# Patient Record
Sex: FEMALE | Race: OTHER | NOT HISPANIC OR LATINO | Employment: FULL TIME | URBAN - METROPOLITAN AREA
[De-identification: names, ages, dates, MRNs, and addresses within clinical notes are randomized per-mention and may not be internally consistent; named-entity substitution may affect disease eponyms.]

---

## 2019-11-11 ENCOUNTER — LAB (OUTPATIENT)
Dept: LAB | Age: 29
End: 2019-11-11

## 2019-11-11 ENCOUNTER — TRANSCRIBE ORDERS (OUTPATIENT)
Dept: URGENT CARE | Age: 29
End: 2019-11-11

## 2019-11-11 ENCOUNTER — APPOINTMENT (OUTPATIENT)
Dept: URGENT CARE | Age: 29
End: 2019-11-11

## 2019-11-11 DIAGNOSIS — Z13.9 SCREENING FOR UNSPECIFIED CONDITION: Primary | ICD-10-CM

## 2019-11-11 DIAGNOSIS — Z13.9 SCREENING FOR UNSPECIFIED CONDITION: ICD-10-CM

## 2019-11-11 LAB
CHOLEST SERPL-MCNC: 167 MG/DL (ref 50–200)
GLUCOSE P FAST SERPL-MCNC: 88 MG/DL (ref 65–99)
HDLC SERPL-MCNC: 49 MG/DL
LDLC SERPL CALC-MCNC: 106 MG/DL (ref 0–100)
NONHDLC SERPL-MCNC: 118 MG/DL
TRIGL SERPL-MCNC: 60 MG/DL

## 2019-11-11 PROCEDURE — 80323 ALKALOIDS NOS: CPT

## 2019-11-11 PROCEDURE — 80061 LIPID PANEL: CPT

## 2019-11-11 PROCEDURE — 82947 ASSAY GLUCOSE BLOOD QUANT: CPT

## 2019-11-11 PROCEDURE — 36415 COLL VENOUS BLD VENIPUNCTURE: CPT

## 2019-11-14 LAB
COTININE SERPL-MCNC: NORMAL NG/ML
NICOTINE SERPL-MCNC: NORMAL NG/ML

## 2019-12-02 ENCOUNTER — TELEPHONE (OUTPATIENT)
Dept: URGENT CARE | Facility: CLINIC | Age: 29
End: 2019-12-02

## 2019-12-02 NOTE — TELEPHONE ENCOUNTER
Lab results discussed with patient    Recommend healthy diet and exercise and discuss her results with her PCP at her next visit       ----- Message from Pipo Jama sent at 11/22/2019 10:15 AM EST -----      ----- Message -----  From: LACY Hardy  Sent: 11/13/2019   1:14 PM EST  To: Sam Singh MA        ----- Message -----  From: Lab, Background User  Sent: 11/11/2019   4:36 PM EST  To: LACY Hardy

## 2020-10-29 ENCOUNTER — APPOINTMENT (OUTPATIENT)
Dept: URGENT CARE | Facility: CLINIC | Age: 30
End: 2020-10-29

## 2020-10-29 DIAGNOSIS — Z23 NEED FOR IMMUNIZATION AGAINST INFLUENZA: Primary | ICD-10-CM

## 2020-11-18 ENCOUNTER — HOSPITAL ENCOUNTER (EMERGENCY)
Facility: HOSPITAL | Age: 30
Discharge: HOME | End: 2020-11-18
Attending: EMERGENCY MEDICINE
Payer: COMMERCIAL

## 2020-11-18 VITALS
HEART RATE: 108 BPM | DIASTOLIC BLOOD PRESSURE: 74 MMHG | TEMPERATURE: 96.8 F | SYSTOLIC BLOOD PRESSURE: 116 MMHG | OXYGEN SATURATION: 100 % | WEIGHT: 99.21 LBS | RESPIRATION RATE: 18 BRPM

## 2020-11-18 DIAGNOSIS — R53.83 FATIGUE: Primary | ICD-10-CM

## 2020-11-18 DIAGNOSIS — Z20.822 SUSPECTED COVID-19 VIRUS INFECTION: ICD-10-CM

## 2020-11-18 LAB
ATRIAL RATE: 106 BPM
BILIRUB UR QL STRIP: NEGATIVE
CLARITY UR: CLEAR
COLOR UR: NORMAL
EXT PREG TEST URINE: NORMAL
EXT. CONTROL ED NAV: NORMAL
GLUCOSE SERPL-MCNC: 94 MG/DL (ref 65–140)
GLUCOSE UR STRIP-MCNC: NEGATIVE MG/DL
HGB UR QL STRIP.AUTO: NEGATIVE
KETONES UR STRIP-MCNC: NEGATIVE MG/DL
LEUKOCYTE ESTERASE UR QL STRIP: NEGATIVE
NITRITE UR QL STRIP: NEGATIVE
P AXIS: 67 DEGREES
PH UR STRIP.AUTO: 7 [PH]
PR INTERVAL: 128 MS
PROT UR STRIP-MCNC: NEGATIVE MG/DL
QRS AXIS: 52 DEGREES
QRSD INTERVAL: 64 MS
QT INTERVAL: 322 MS
QTC INTERVAL: 427 MS
SP GR UR STRIP.AUTO: 1.01 (ref 1–1.04)
T WAVE AXIS: 47 DEGREES
UROBILINOGEN UA: NEGATIVE MG/DL
VENTRICULAR RATE: 106 BPM

## 2020-11-18 PROCEDURE — 81025 URINE PREGNANCY TEST: CPT | Performed by: EMERGENCY MEDICINE

## 2020-11-18 PROCEDURE — 93010 ELECTROCARDIOGRAM REPORT: CPT | Performed by: INTERNAL MEDICINE

## 2020-11-18 PROCEDURE — 93005 ELECTROCARDIOGRAM TRACING: CPT

## 2020-11-18 PROCEDURE — 99284 EMERGENCY DEPT VISIT MOD MDM: CPT | Performed by: EMERGENCY MEDICINE

## 2020-11-18 PROCEDURE — 82948 REAGENT STRIP/BLOOD GLUCOSE: CPT

## 2020-11-18 PROCEDURE — 81003 URINALYSIS AUTO W/O SCOPE: CPT | Performed by: EMERGENCY MEDICINE

## 2020-11-18 PROCEDURE — 87637 SARSCOV2&INF A&B&RSV AMP PRB: CPT | Performed by: EMERGENCY MEDICINE

## 2020-11-18 PROCEDURE — 99283 EMERGENCY DEPT VISIT LOW MDM: CPT

## 2020-11-21 LAB
FLUAV RNA NPH QL NAA+PROBE: NOT DETECTED
FLUBV RNA NPH QL NAA+PROBE: NOT DETECTED
RSV RNA NPH QL NAA+PROBE: NOT DETECTED
SARS-COV-2 RNA NPH QL NAA+PROBE: NOT DETECTED

## 2020-12-15 ENCOUNTER — OFFICE VISIT (OUTPATIENT)
Dept: PSYCHOLOGY | Facility: CLINIC | Age: 30
End: 2020-12-15
Payer: COMMERCIAL

## 2020-12-15 ENCOUNTER — OFFICE VISIT (OUTPATIENT)
Dept: PSYCHIATRY | Facility: CLINIC | Age: 30
End: 2020-12-15
Payer: COMMERCIAL

## 2020-12-15 VITALS
BODY MASS INDEX: 17.94 KG/M2 | WEIGHT: 95 LBS | HEART RATE: 92 BPM | TEMPERATURE: 97.9 F | DIASTOLIC BLOOD PRESSURE: 73 MMHG | HEIGHT: 61 IN | RESPIRATION RATE: 18 BRPM | SYSTOLIC BLOOD PRESSURE: 111 MMHG

## 2020-12-15 DIAGNOSIS — F41.1 GENERALIZED ANXIETY DISORDER: Primary | ICD-10-CM

## 2020-12-15 PROCEDURE — S0201 PARTIAL HOSPITALIZATION SERV: HCPCS

## 2020-12-15 PROCEDURE — 90791 PSYCH DIAGNOSTIC EVALUATION: CPT

## 2020-12-15 PROCEDURE — 90792 PSYCH DIAG EVAL W/MED SRVCS: CPT | Performed by: PSYCHIATRY & NEUROLOGY

## 2020-12-15 RX ORDER — HYDROXYZINE HYDROCHLORIDE 25 MG/1
25 TABLET, FILM COATED ORAL 3 TIMES DAILY PRN
COMMUNITY
Start: 2020-11-17

## 2020-12-16 ENCOUNTER — OFFICE VISIT (OUTPATIENT)
Dept: PSYCHOLOGY | Facility: CLINIC | Age: 30
End: 2020-12-16
Payer: COMMERCIAL

## 2020-12-16 DIAGNOSIS — F41.1 GENERALIZED ANXIETY DISORDER: Primary | ICD-10-CM

## 2020-12-16 PROCEDURE — G0176 OPPS/PHP;ACTIVITY THERAPY: HCPCS

## 2020-12-16 PROCEDURE — S0201 PARTIAL HOSPITALIZATION SERV: HCPCS

## 2020-12-16 PROCEDURE — G0410 GRP PSYCH PARTIAL HOSP 45-50: HCPCS

## 2020-12-16 PROCEDURE — G0177 OPPS/PHP; TRAIN & EDUC SERV: HCPCS

## 2020-12-17 ENCOUNTER — OFFICE VISIT (OUTPATIENT)
Dept: PSYCHOLOGY | Facility: CLINIC | Age: 30
End: 2020-12-17
Payer: COMMERCIAL

## 2020-12-17 DIAGNOSIS — F41.1 GENERALIZED ANXIETY DISORDER: Primary | ICD-10-CM

## 2020-12-17 PROCEDURE — G0410 GRP PSYCH PARTIAL HOSP 45-50: HCPCS

## 2020-12-17 PROCEDURE — S0201 PARTIAL HOSPITALIZATION SERV: HCPCS

## 2020-12-17 PROCEDURE — G0177 OPPS/PHP; TRAIN & EDUC SERV: HCPCS

## 2020-12-17 PROCEDURE — G0176 OPPS/PHP;ACTIVITY THERAPY: HCPCS

## 2020-12-18 ENCOUNTER — OFFICE VISIT (OUTPATIENT)
Dept: PSYCHOLOGY | Facility: CLINIC | Age: 30
End: 2020-12-18
Payer: COMMERCIAL

## 2020-12-18 DIAGNOSIS — F41.1 GENERALIZED ANXIETY DISORDER: Primary | ICD-10-CM

## 2020-12-18 PROCEDURE — G0177 OPPS/PHP; TRAIN & EDUC SERV: HCPCS

## 2020-12-18 PROCEDURE — G0410 GRP PSYCH PARTIAL HOSP 45-50: HCPCS

## 2020-12-18 PROCEDURE — S0201 PARTIAL HOSPITALIZATION SERV: HCPCS

## 2020-12-18 PROCEDURE — G0176 OPPS/PHP;ACTIVITY THERAPY: HCPCS

## 2020-12-21 ENCOUNTER — OFFICE VISIT (OUTPATIENT)
Dept: PSYCHOLOGY | Facility: CLINIC | Age: 30
End: 2020-12-21
Payer: COMMERCIAL

## 2020-12-21 ENCOUNTER — TELEMEDICINE (OUTPATIENT)
Dept: PSYCHIATRY | Facility: CLINIC | Age: 30
End: 2020-12-21
Payer: COMMERCIAL

## 2020-12-21 DIAGNOSIS — F41.1 GENERALIZED ANXIETY DISORDER: Primary | ICD-10-CM

## 2020-12-21 PROCEDURE — G0410 GRP PSYCH PARTIAL HOSP 45-50: HCPCS

## 2020-12-21 PROCEDURE — S0201 PARTIAL HOSPITALIZATION SERV: HCPCS

## 2020-12-21 PROCEDURE — G0177 OPPS/PHP; TRAIN & EDUC SERV: HCPCS

## 2020-12-21 PROCEDURE — 99213 OFFICE O/P EST LOW 20 MIN: CPT | Performed by: NURSE PRACTITIONER

## 2020-12-22 ENCOUNTER — APPOINTMENT (OUTPATIENT)
Dept: PSYCHOLOGY | Facility: CLINIC | Age: 30
End: 2020-12-22
Payer: COMMERCIAL

## 2020-12-22 ENCOUNTER — DOCUMENTATION (OUTPATIENT)
Dept: PSYCHOLOGY | Facility: CLINIC | Age: 30
End: 2020-12-22

## 2020-12-23 ENCOUNTER — OFFICE VISIT (OUTPATIENT)
Dept: PSYCHOLOGY | Facility: CLINIC | Age: 30
End: 2020-12-23
Payer: COMMERCIAL

## 2020-12-23 DIAGNOSIS — F41.1 GENERALIZED ANXIETY DISORDER: Primary | ICD-10-CM

## 2020-12-23 PROCEDURE — S0201 PARTIAL HOSPITALIZATION SERV: HCPCS

## 2020-12-23 PROCEDURE — G0176 OPPS/PHP;ACTIVITY THERAPY: HCPCS

## 2020-12-23 PROCEDURE — G0177 OPPS/PHP; TRAIN & EDUC SERV: HCPCS

## 2020-12-23 PROCEDURE — G0410 GRP PSYCH PARTIAL HOSP 45-50: HCPCS

## 2020-12-24 ENCOUNTER — OFFICE VISIT (OUTPATIENT)
Dept: PSYCHOLOGY | Facility: CLINIC | Age: 30
End: 2020-12-24
Payer: COMMERCIAL

## 2020-12-24 DIAGNOSIS — F41.1 GENERALIZED ANXIETY DISORDER: Primary | ICD-10-CM

## 2020-12-24 PROCEDURE — S0201 PARTIAL HOSPITALIZATION SERV: HCPCS

## 2020-12-24 PROCEDURE — G0410 GRP PSYCH PARTIAL HOSP 45-50: HCPCS

## 2020-12-24 PROCEDURE — G0176 OPPS/PHP;ACTIVITY THERAPY: HCPCS

## 2020-12-28 ENCOUNTER — OFFICE VISIT (OUTPATIENT)
Dept: PSYCHOLOGY | Facility: CLINIC | Age: 30
End: 2020-12-28
Payer: COMMERCIAL

## 2020-12-28 ENCOUNTER — TELEMEDICINE (OUTPATIENT)
Dept: PSYCHIATRY | Facility: CLINIC | Age: 30
End: 2020-12-28
Payer: COMMERCIAL

## 2020-12-28 DIAGNOSIS — F41.1 GENERALIZED ANXIETY DISORDER: Primary | ICD-10-CM

## 2020-12-28 PROCEDURE — 99214 OFFICE O/P EST MOD 30 MIN: CPT | Performed by: NURSE PRACTITIONER

## 2020-12-28 PROCEDURE — G0410 GRP PSYCH PARTIAL HOSP 45-50: HCPCS

## 2020-12-28 PROCEDURE — S0201 PARTIAL HOSPITALIZATION SERV: HCPCS

## 2020-12-28 PROCEDURE — G0176 OPPS/PHP;ACTIVITY THERAPY: HCPCS

## 2020-12-28 PROCEDURE — G0177 OPPS/PHP; TRAIN & EDUC SERV: HCPCS

## 2020-12-28 RX ORDER — SERTRALINE HYDROCHLORIDE 25 MG/1
25 TABLET, FILM COATED ORAL DAILY
Qty: 30 TABLET | Refills: 0 | Status: SHIPPED | OUTPATIENT
Start: 2020-12-28 | End: 2021-05-18 | Stop reason: ALTCHOICE

## 2020-12-29 ENCOUNTER — DOCUMENTATION (OUTPATIENT)
Dept: PSYCHOLOGY | Facility: CLINIC | Age: 30
End: 2020-12-29

## 2020-12-29 ENCOUNTER — APPOINTMENT (OUTPATIENT)
Dept: PSYCHOLOGY | Facility: CLINIC | Age: 30
End: 2020-12-29
Payer: COMMERCIAL

## 2020-12-30 ENCOUNTER — OFFICE VISIT (OUTPATIENT)
Dept: PSYCHOLOGY | Facility: CLINIC | Age: 30
End: 2020-12-30
Payer: COMMERCIAL

## 2020-12-30 DIAGNOSIS — F41.1 GENERALIZED ANXIETY DISORDER: Primary | ICD-10-CM

## 2020-12-30 PROCEDURE — S0201 PARTIAL HOSPITALIZATION SERV: HCPCS

## 2020-12-30 PROCEDURE — G0177 OPPS/PHP; TRAIN & EDUC SERV: HCPCS

## 2020-12-30 PROCEDURE — G0410 GRP PSYCH PARTIAL HOSP 45-50: HCPCS

## 2020-12-31 ENCOUNTER — OFFICE VISIT (OUTPATIENT)
Dept: PSYCHOLOGY | Facility: CLINIC | Age: 30
End: 2020-12-31
Payer: COMMERCIAL

## 2020-12-31 DIAGNOSIS — F41.1 GENERALIZED ANXIETY DISORDER: Primary | ICD-10-CM

## 2020-12-31 PROCEDURE — S0201 PARTIAL HOSPITALIZATION SERV: HCPCS

## 2020-12-31 PROCEDURE — G0410 GRP PSYCH PARTIAL HOSP 45-50: HCPCS

## 2020-12-31 PROCEDURE — G0177 OPPS/PHP; TRAIN & EDUC SERV: HCPCS

## 2021-01-04 ENCOUNTER — DOCUMENTATION (OUTPATIENT)
Dept: PSYCHOLOGY | Facility: CLINIC | Age: 31
End: 2021-01-04

## 2021-01-04 ENCOUNTER — APPOINTMENT (OUTPATIENT)
Dept: PSYCHOLOGY | Facility: CLINIC | Age: 31
End: 2021-01-04
Payer: COMMERCIAL

## 2021-01-04 ENCOUNTER — TELEMEDICINE (OUTPATIENT)
Dept: PSYCHIATRY | Facility: CLINIC | Age: 31
End: 2021-01-04
Payer: COMMERCIAL

## 2021-01-04 DIAGNOSIS — F41.1 GENERALIZED ANXIETY DISORDER: Primary | ICD-10-CM

## 2021-01-04 PROCEDURE — 99213 OFFICE O/P EST LOW 20 MIN: CPT | Performed by: NURSE PRACTITIONER

## 2021-01-04 NOTE — PROGRESS NOTES
This note was not shared with the patient due to this is a psychotherapy note   Subjective:     Patient ID: Deandre Arenas is a 31 y o  female  [facility] Clinical Progress Notes      Specialized Services Documentation  Therapist must complete separate progress note for each specific clinical activity in which the individual participated during the day  Case Management Note    Quinton Montalvo MT-BC    Current suicide risk : Low     1422 Excused in preparation for discharge and transition appointment  Spoke with Deandre Arenas [phone] to assure readiness for discharge and confirm aftercare  She reported she is doing well and ready for discharge tomorrow  Relapse prevention plan forwarded to her for completion  Medications changes/added/denied? No    Treatment session number: na    Individual Case Management Visit provided today? Yes     [facility] follow up physician's orders: see S   Yandy Note

## 2021-01-04 NOTE — PSYCH
Virtual Regular Visit    Problem List Items Addressed This Visit        Other    Generalized anxiety disorder - Primary             Encounter provider LACY Thrasher    Provider located at   [address]  [facility] [address]    Recent Visits  Date Type Provider Dept   12/28/20 Telemedicine Mandie Thrasher 426 recent visits within past 7 days and meeting all other requirements     Today's Visits  Date Type Provider Dept   01/04/21 Telemedicine Mandie Thrasher 426 today's visits and meeting all other requirements     Future Appointments  No visits were found meeting these conditions  Showing future appointments within next 150 days and meeting all other requirements      The patient was identified by name and date of birth  Hitesh Barboza was informed that this is a telemedicine visit and that the visit is being conducted through [employer]  My office door was closed  No one else was in the room  She acknowledged consent and understanding of privacy and security of the video platform  The patient has agreed to participate and understands they can discontinue the visit at any time  Patient is aware this is a billable service  HPI     Current Outpatient Medications   Medication Sig Dispense Refill    hydrOXYzine HCL (ATARAX) 25 mg tablet Take 25 mg by mouth 3 (three) times a day as needed for anxiety      sertraline (ZOLOFT) 25 mg tablet Take 1 tablet (25 mg total) by mouth daily 30 tablet 0     No current facility-administered medications for this visit  Review of Systems  Video Exam    There were no vitals filed for this visit  Physical Exam   As a result of this visit, I have referred the patient for further respiratory evaluation   No    I spent 15 minutes directly with the patient during this visit  VIRTUAL VISIT DISCLAIMER    Greg Zepeda acknowledges that she has consented to an online visit or consultation  She understands that the online visit is based solely on information provided by her, and that, in the absence of a face-to-face physical evaluation by the physician, the diagnosis she receives is both limited and provisional in terms of accuracy and completeness  This is not intended to replace a full medical face-to-face evaluation by the physician  Greg Zepeda understands and accepts these terms  PHP MEDICATION MANAGEMENT NOTE        [facility]    Name and Date of Birth:  Greg Zepeda 31 y o  1990 MRN: [id number]    Date of Visit: January 4, 2021    No Known Allergies  SUBJECTIVE:    Didi Elizabeth is seen today for a follow up for anxiety  She reports that she has improved slightly since the last visit  Patient is taking half tablet of Zoloft 25 mg daily  States she initially had some increased anxiety upset stomach  However now patient is doing better, able to eat 3 small meals per day and is sleeping through the night  Adequate supply of medication  Planned discharge tomorrow  She denies any side effects from medications  PLAN:  Follow-up with outpatient psychiatry and individual therapy  Aware of 24 hour and weekend coverage for urgent situations accessed by calling [facility] main practice number  Continue partial hospitalization program    Diagnoses and all orders for this visit:    Generalized anxiety disorder        Current Outpatient Medications on File Prior to Visit   Medication Sig Dispense Refill    hydrOXYzine HCL (ATARAX) 25 mg tablet Take 25 mg by mouth 3 (three) times a day as needed for anxiety      sertraline (ZOLOFT) 25 mg tablet Take 1 tablet (25 mg total) by mouth daily 30 tablet 0     No current facility-administered medications on file prior to visit          Psychotherapy Provided:     Individual psychotherapy provided: No    HPI ROS Appetite Changes and Sleep:     She reports adequate number of sleep hours (7 hours), adequate appetite, adequate energy level   Patient denies suicidal or homicidal ideation    Review Of Systems:      General emotional problems, appetite disturbances and decreased functioning   Personality no change in personality   Constitutional negative   ENT negative   Cardiovascular negative   Respiratory negative   Gastrointestinal negative   Genitourinary negative   Musculoskeletal negative   Integumentary negative   Neurological negative   Endocrine negative   Other Symptoms none, all other systems are negative     Mental Status Evaluation:    Appearance Adequate hygiene and grooming   Behavior calm and cooperative   Mood anxious  Depression Scale -  of 10 (0 = No depression)  Anxiety Scale -  of 10 (0 = No anxiety)   Speech Normal rate and volume   Affect appropriate and mood-congruent   Thought Processes Goal directed and coherent   Thought Content Does not verbalize delusional material   Associations Tightly connected   Perceptual Disturbances Denies hallucinations and does not appear to be responding to internal stimuli   Risk Potential Suicidal/Homicidal Ideation - No evidence of suicidal or homicidal ideation and Patient does not verbalize suicidal or homicidal ideation  Risk of Violence - No evidence of risk for violence found on assessment  Risk of Self Mutilation - No evidence of risk for self mutilation found on assessment   Orientation oriented to person, place, time/date and situation   Memory recent and remote memory grossly intact   Consciousness alert and awake   Attention/Concentration attention span and concentration are age appropriate   Insight fair   Judgement fair   Muscle Strength and Gait normal muscle strength and normal muscle tone, normal gait/station and normal balance   Motor Activity no abnormal movements   Language no difficulty naming common objects, no difficulty repeating a phrase, no difficulty writing a sentence   Fund of Knowledge adequate knowledge of current events  adequate fund of knowledge regarding past history  adequate fund of knowledge regarding vocabulary      Past Psychiatric History Update:     Inpatient Psychiatric Admission Since Last Encounter:   no  Suicide Attempt Or Self Mutilation Since Last Encounter:   no  Incidence of Violent Behavior Since Last Encounter:   no    Traumatic History Update:     New Onset of Abuse Since Last Encounter:   no  Traumatic Events Since Last Encounter:   no    Past Medical History:    Past Medical History:   Diagnosis Date    Anxiety      Past Medical History Pertinent Negatives:   Diagnosis Date Noted    Head injury 12/15/2020    Seizures ([address] ) 12/15/2020     Past Surgical History:   Procedure Laterality Date    CYST REMOVAL Left      No Known Allergies  Substance Abuse History:    Social History     Substance and Sexual Activity   Alcohol Use Not Currently    Frequency: Never    Comment: She only drinks in social events     Social History     Substance and Sexual Activity   Drug Use Never     Social History:    Social History     Socioeconomic History    Marital status: Single     Spouse name: Not on file    Number of children: 0    Years of education: associate degree     Highest education level: Associate degree: academic program   Occupational History     Employer: [address] [place] [address] resource strain: Not hard at all   [employer] [place] insecurity     Worry: Not on file     Inability: Not on file   [employer] needs     Medical: Not on file     Non-medical: Not on file   Tobacco Use    Smoking status: Never Smoker    Smokeless tobacco: Never Used   Substance and Sexual Activity    Alcohol use: Not Currently     Frequency: Never     Comment: She only drinks in social events    Drug use: Never    Sexual activity: Not on file   Lifestyle    Physical activity     Days per week: 3 days     Minutes per session: 10 min    Stress: Only a little   Relationships    Social connections     Talks on phone: More than three times a week     Gets together: Not on file     Attends Oriental orthodox service: Not on file     Active member of club or organization: Not on file     Attends meetings of clubs or organizations: Not on file     Relationship status: Never     Intimate partner violence     Fear of current or ex partner: No     Emotionally abused: No     Physically abused: No     Forced sexual activity: No   Other Topics Concern    Not on file   Social History Narrative    Not on file     Family Psychiatric History:     Family History   Problem Relation Age of Onset    Seizures Mother     Hyperlipidemia Father     No Known Problems Sister     Psychiatric Illness Neg Hx      History Review: The following portions of the patient's history were reviewed and updated as appropriate: allergies, current medications, past family history, past medical history, past social history, past surgical history and problem list     OBJECTIVE:     Vital signs in last 24 hours: There were no vitals filed for this visit  Laboratory Results: I have personally reviewed all pertinent laboratory/tests results  Medications Risks/Benefits:      Risks, Benefits And Possible Side Effects Of Medications:    Discussed risks and benefits of treatment with patient including risk of suicidality, serotonin syndrome and SIADH related to treatment with antidepressants; Risk of induction of manic symptoms in certain patient populations     Controlled Medication Discussion:     Not applicable    LACY Burns 01/04/21    This note was shared with patient

## 2021-01-05 ENCOUNTER — OFFICE VISIT (OUTPATIENT)
Dept: PSYCHOLOGY | Facility: CLINIC | Age: 31
End: 2021-01-05
Payer: COMMERCIAL

## 2021-01-05 DIAGNOSIS — F41.1 GENERALIZED ANXIETY DISORDER: Primary | ICD-10-CM

## 2021-01-05 PROCEDURE — G0176 OPPS/PHP;ACTIVITY THERAPY: HCPCS

## 2021-01-05 PROCEDURE — S0201 PARTIAL HOSPITALIZATION SERV: HCPCS

## 2021-01-05 PROCEDURE — G0410 GRP PSYCH PARTIAL HOSP 45-50: HCPCS

## 2021-01-05 PROCEDURE — G0177 OPPS/PHP; TRAIN & EDUC SERV: HCPCS

## 2021-01-05 NOTE — PSYCH
This note was not shared with the patient due to this is a psychotherapy note     Subjective:     Patient ID: Dilia Loredo is a 31 y o  female  [facility] Discharge Summary:   Admission Date: 12/15/2020  Patient was referred by Dr Stephie Keita , PCP  Discharge Date: 01/05/21   Was this a routine discharge? yes   Diagnosis: Axis I:   1  Generalized anxiety disorder        Treating Physician: Dr Lata Lyles MD   Treatment Complications: none  Presenting Need:   Per Dr David Wan: Seven Brown is a 31 y  o  female with generalized anxiety disorder and vitamin-D deficiency referred by Dr Stephie Keita  , her primary physician because she has increased anxiety, excessive worries, changes in sleep and appetite   Onset of symptoms was  a few weeks ago with gradually worsening course since that time   Psychosocial Stressors:  Relationship   She states that she has suffered from anxiety for some time but this became worse to 3 weeks ago when her anxiety was very high, was not eating, was vomiting after she tried to eat something, she was not sleeping, poor concentration low motivation and low energy   She states that she has to be sees to the ED with multiples physical symptoms but all her labs and test were normal   She states that she have been in a relationship since she was 18, unfortunate he had been incarcerated for 7 years and now he is very close to be released, but she worries that he had not changed and he still have issue with some drugs   She states that she never has told him how she feels because she does not want to hurt his feelings but now she realized that she wanted to know where they are in the relationship and if he is willing not to use drugs and if he is willing to work in the relationship for the best   This has been difficult for her and have increased her anxiety level   She also had been supportive of him all of this years   She get along very well with his family, she also has support from her family   She states that she have a good job   She denies any other issues Rossy Brown feels anxious, denies any suicidal homicidal ideation plan or intent, denies any psychotic symptoms, denies any history manic episodes   She states her primary physician gave to her Atarax that was helpful and she only uses p r n  Ravi Greenwood was also prescribed Zoloft 25 mg p o  Daily but she only took it once and her primary physician decided to hold in this medication at this moment and recommended that she come to the program for group therapy and after that follow individual therapy       Per this writer: Delma Nye referred by her PCP due to increased anxiety with changes in ability to complete daily tasks  On FMLA, difficulty eating, sleeping, and having energy to complete daily tasks  Diminished interests and isolation  She recognizing she is anxious about her future after holding on to a 8year old relationship with a gentleman incarcerated for drug addiction  He will be released from halfway within 2 years and she is fearful of the continued cycle  Despite this, she continues to voice uncertainty about ending the relationship  She feels "pressured" and ruminates when alone       Per Edwena Schaumann: "I feel so emotional and different it is scary"  Strengths: family support, enjoys her work, motivated for treatment     Course of treatment includes:    group counseling, medication management, individual case management, allied therapy, psychoeducation, psychiatric evaluation and individual therapy      Treatment Progress: Delma Nye attended 10 treatment days in which objectives focused on self-care, education related to co-dependent patterns, and anxiety reduction strategies  Edwena Schaumann was active in 1:1 time and was attentive during groups  She was quiet in sessions, but did increase comfort and shared more throughout time in groups   She is able to identify unhealthy aspects of current relationship, yet still struggles with making decisions related to future  She was able to work on General VANCL and beginning to identify personal self-care needs  She was started on low dose of Zoloft which can be titrated  Nabor Chavez was receptive to ongoing OP therapy  Jose Rtina Castellanos shared improvement through anxiety feeling "stable", sleep and appetite improvement, and beng more social She felt her initial therapy appointment went very well yesterday and is  "excited" to continue  Denied SI, HI, and psychosis  Aftercare providers to receive summary       Aftercare recommendations include:   Medication Management:   Dr Pattie Cintron  [phone]  January 18,2021 3:45pm     [address]   [phone]  January 11, 2021 5pm    Discharge Medications include:  Current Outpatient Medications:     hydrOXYzine HCL (ATARAX) 25 mg tablet, Take 25 mg by mouth 3 (three) times a day as needed for anxiety, Disp: , Rfl:     sertraline (ZOLOFT) 25 mg tablet, Take 1 tablet (25 mg total) by mouth daily, Disp: 30 tablet, Rfl: 0

## 2021-01-05 NOTE — PSYCH
Virtual Regular Visit      Assessment/Plan:    Problem List Items Addressed This Visit        Other    Generalized anxiety disorder - Primary               Reason for visit is VIRTUAL PHP DUE TO COVID-19       Encounter provider [facility] [address] [place] [address] PROGRAM    Provider located at [address] [place] [id number]      Recent Visits  Date Type Provider Dept   12/31/20 Office Visit [facility]   12/30/20 Office Visit [facility]   Showing recent visits within past 7 days and meeting all other requirements     Today's Visits  Date Type Provider Dept   01/05/21 Office Visit [facility]   Showing today's visits and meeting all other requirements     Future Appointments  No visits were found meeting these conditions  Showing future appointments within next 150 days and meeting all other requirements        The patient was identified by name and date of birth  Dilia Loredo was informed that this is a telemedicine visit and that the visit is being conducted through [employer] and patient was informed that this is a secure, HIPAA-compliant platform  She agrees to proceed     My office door was closed  No one else was in the room  She acknowledged consent and understanding of privacy and security of the video platform  The patient has agreed to participate and understands they can discontinue the visit at any time  Patient is aware this is a billable service  Subjective  Dilia Loredo is a 31 y o  female          HPI     Past Medical History:   Diagnosis Date    Anxiety        Past Surgical History:   Procedure Laterality Date    CYST REMOVAL Left        Current Outpatient Medications   Medication Sig Dispense Refill    hydrOXYzine HCL (ATARAX) 25 mg tablet Take 25 mg by mouth 3 (three) times a day as needed for anxiety      sertraline (ZOLOFT) 25 mg tablet Take 1 tablet (25 mg total) by mouth daily 30 tablet 0     No current facility-administered medications for this visit  No Known Allergies    Review of Systems    Video Exam    There were no vitals filed for this visit  Physical Exam     I spent 4 hours of group + case management minutes with patient today in which greater than 50% of the time was spent in counseling/coordination of care regarding see notes  VIRTUAL VISIT DISCLAIMER    Horacio Cuenca acknowledges that she has consented to an online visit or consultation  She understands that the online visit is based solely on information provided by her, and that, in the absence of a face-to-face physical evaluation by the physician, the diagnosis she receives is both limited and provisional in terms of accuracy and completeness  This is not intended to replace a full medical face-to-face evaluation by the physician  Horacio Cuenca understands and accepts these terms

## 2021-01-05 NOTE — PSYCH
This note was not shared with the patient due to this is a psychotherapy note       Subjective:     Patient ID: Tasia Padilla is a 31 y o  female  [facility] Clinical Progress Notes      Specialized Services Documentation  Therapist must complete separate progress note for each specific clinical activity in which the individual participated during the day  Allied Therapy   This group was facilitated virtually in a private office using [employer] and Approved [employer] Teams  Tasia Padilla consented to the use of tele-video modality of treatment  [id number] Tasia Padilla  actively shared in [facility] group exploring DBT distress tolerance crisis survival strategies  Group explored crisis survival strategies ACCEPTS, TIP, STOP, and PROS & CONS) reinforcing actions one could take to learn to tolerate stressful experiences, thoughts and urges  Adela Cantrell was attentive  She felt DISTRACTS with ACCEPTS was a skill could put effort into practicing  Some positive progress toward goal noted  Discharge at the end of the treatment day  Tx Plan Objective: 1 1, Therapist:  Tami SMITH    Case Management Note  This case management session was facilitated virtually in a private office using HIPPA Compliant and Approved [employer] Teams  Tasia Padilla consented to the use of tele-video modality of treatment  Tami GONZALEZBC    Current suicide risk : Low     [id number] Met with Tasia Padilla  Reviewed relapse prevention plan, aftercare plan, and medication list (copies provided)  Tasia Padilla shared improvement through anxiety feeling "stable", sleep and appetite improvement, and beng more social She felt her initial therapy appointment went very well yesterday and is  "excited" to continue  Denied SI, HI, and psychosis  Aftercare providers to receive summary  Medications changes/added/denied?  No    Treatment session number: 10    Individual Case Management Visit provided today?  Yes     [facility] follow up physician's orders: see discharge order

## 2021-01-05 NOTE — PSYCH
Assessment/Plan:      There are no diagnoses linked to this encounter  Subjective:     Patient ID: Venkatesh Crook is a 31 y o  female  [facility]   AREAS OF NEED: Anxiety as evidenced by sleep and appetite disturbance, low energy, ruminating thoughts, feeling pressured, isolation, negative thoughts, physical discomfort related to relationship stressors  Date Initiated: 12/15/2020    Strengths: family support, enjoys her work, motivation for treatment     LONG TERM GOAL:   Date Initiated: 12/15/2020  1 0 I will identify 3 signs that my anxiety has lessened in intensity and frequency and I feel more productive in my day to day life  Target Date: 1/12/2021  Completion Date: 01/05/21       SHORT TERM OBJECTIVES:     Date Initiated:  12/15/2020  1 1 I will identify 3 mindfulness and/or distress tolerance skills I can utilize to manage anxiety - practicing at least 2 daily  Revision Date: 12/28/2020  Target Date: 12/28/2020  Completion Date: 01/05/21     Date Initiated:  12/15/2020  1 2 I will read handouts on co-dependency provided and begin to keep a journal of 3 things I am doing for self-care daily  Revision Date: 12/28/2020  Target Date: 12/28/2020  Completion Date: 01/05/21     Date Initiated:  12/15/2020  1 3 I will take medications as prescribed and share questions and concerns if arise  Revision Date: 12/28/2020  Target Date: 12/28/2020  Completion Date: 01/05/21     Date Initiated:  12/15/2020  1 4 I will identify 3 ways my supports can assist in my recovery and agree to staff/support contact as indicated      Revision Date: 12/28/2020  Target Date: 12/28/2020  Completion Date: 01/05/21          7 DAY REVISION:    Date Initiated:12/28/2020  1 5 I will identify 3 strategies that are helping me decrease my anxiety and 3 ways I will support my ongoing wellness after discharge  Revision Date:   Target Date: 01/08/2021  Completion Date:01/05/21       PSYCHIATRY:  Date Initiated: 12/15/2020   Medication Management and Education       Revision Date: 12/28/2020       Continue medication management  The person(s) responsible for carrying out the plan is Vilma Bassett MD    NURSING:   Date Initiated: 12/15/2020   1 1,1 2,1 3,1 4 This RN will provide daily wellness group five days weekly to educate Ashlee Porras on S/S of her diagnoses and medications used in treatment  Revision Date:12/28/2020  1 1,1 2,1 3,1 4,1 5 Continue to encourage Ashlee Porras to participate in wellness groups daily to learn about symptoms, coping strategies and warning signs to promote relapse prevention  The person(s) responsible for carrying out the plan is Tena Sanchez RN    PSYCHOLOGY:   Date Initiated: 12/15/2020        1 1, 1 2, 1 4 Provide psychotherapy group 5 times per week to allow opportunity for Ashlee Porras to explore stressors and ways of coping  Revision Date: 12/28/2020  1 1,1 2,1 4,1 5 Continue to provide psychotherapy group daily to Ashlee Porras and encourage sharing of stressors, skills and positive change  The person(s) responsible for carrying out the plan is Pipo Licea    ALLIED THERAPY:   Date Initiated: 12/15/2020   1 1,1 2 [address] in AT group 5 times daily to encourage development and use of wellness tools to decrease symptoms and promote recovery through meaningful activity  Revision Date: 12/28/2020  1 1,1 2,1 5 Continue to engage Ashlee Porras to participate in AT group to practice wellness tools within program and transfer to home sharing successes and barriers through healthy task involvement     The person(s) responsible for carrying out the plan is LUIS Martinez     CASE MANAGEMENT:   Date Initiated:  12/15/2020      1 0 This  will meet with Ashlee Porras 3-4 times weekly to assess treatment progress, discharge planning, connection to community supports and UR as indicated  Revision Date: 12/28/2020  1 0 Continue to meet with Nidia Mcgraw 3-4 times weekly to assess growth, work toward goals, continued treatment needs, dc planning and use of supports  The person(s) responsible for carrying out the plan is LUIS Palafox         TREATMENT REVIEW/COMMENTS:     DISCHARGE CRITERIA: Identify 3 signs of progress and complete relapse prevention plan  DISCHARGE PLAN: OP providers  Estimated Length of Stay:10 treatment days      Diagnosis and Treatment Plan explained to Kranthi Bradshaw relates understanding diagnosis and is agreeable to Treatment Plan         CLIENT COMMENTS / Please share your thoughts, feelings, need and/or experiences regarding your treatment plan: _____________________________________________________________________________________________________________________________________________________________________________________________________________________________________________________________________________________________________________________ Date/Time: ______________

## 2021-01-05 NOTE — PSYCH
This note was not shared with the patient due to this is a psychotherapy note  Subjective:     Patient ID: Rex Marquez is a 31 y o  female  [facility] Clinical Progress Notes      Specialized Services Documentation  Therapist must complete separate progress note for each specific clinical activity in which the individual participated during the day  Group Psychotherapy Life Skills Group (10:25-11:10) Reinaldo Sheth actively engaged in group focused on her personal narrative using the tree of life tool which was facilitated virtually in a private office using HIPAA Compliant and Approved Microsoft Teams  Reinaldo Sheth consented to the use of tele-video modality of treatment and was virtually present for group psychotherapy today  The group created their own tree of life which represents who they are  They had to write words that answered the questions about each part of the tree which represents them  During the activity Reinaldo Sheth discussed her tree  Reinaldo Sheth identified learning about herself and who she really is   Reinaldo Sheth recognized that  she needs to establish boundaries and needs to learn how to manage her anxiety best iin order to grow  Reinaldo Sheth continues to make progress towards goals through verbal participation in group  She will be discharged at the end of treatment day  Tx Plan Objective: 1 1,1 2 Therapist:  SUSHIL Sunshine        Education Therapy   [id number] Rex Marquez actively shared in morning assessment and goal review  Presented as Receptive related to readiness to learn  Rex Marquez did complete goal from last treatment day identifying gaining a better bond with her sister  did not present with any barriers to learning  Will discharge at the end of treatment day  [address] engaged throughout the treatment day  Was engaged in learning related to Illness, Medication, Aftercare and Wellness Tools   Staff utilized Verbal, Written, A/V and Demonstration teaching methods  Marnie Hoyos shared area of learning and set a goal for outside of program to follow up with the therapist   Will discharge at the end of treatment day      Tx Plan Objective: 1 1,1 2 Therapist:  SUSHIL Casas

## 2021-01-05 NOTE — PSYCH
[facility] Clinical Progress Notes      Specialized Services Documentation  Therapist must complete separate progress note for each specific clinical activity in which the individual participated during the day         [facility] follow up physician's orders:   DATE 1/5/2021  TIME 9:49   Isabel 19 MD Jayna

## 2021-01-05 NOTE — PSYCH
This note was not shared with the patient due to this is a psychotherapy note   Subjective:     Patient ID: Lexa Castellanos is a 31 y o  female  [facility] Clinical Progress Notes      Specialized Services Documentation  Therapist must complete separate progress note for each specific clinical activity in which the individual participated during the day  GROUP PSYCHOTHERAPY ([id number]) Group was facilitated virtually in a private office using HIPAA Compliant and Approved Microsoft Teams  Lexa Castellanos consented to the use of tele-video modality of treatment and was virtually present for group psychotherapy today  The group engaged in education about thinking errors  Clients practiced identifying and reframing thinking errors  Nabor Chavez 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

## 2021-01-06 ENCOUNTER — APPOINTMENT (OUTPATIENT)
Dept: PSYCHOLOGY | Facility: CLINIC | Age: 31
End: 2021-01-06
Payer: COMMERCIAL

## 2021-01-07 ENCOUNTER — APPOINTMENT (OUTPATIENT)
Dept: PSYCHOLOGY | Facility: CLINIC | Age: 31
End: 2021-01-07
Payer: COMMERCIAL

## 2021-01-08 ENCOUNTER — APPOINTMENT (OUTPATIENT)
Dept: PSYCHOLOGY | Facility: CLINIC | Age: 31
End: 2021-01-08
Payer: COMMERCIAL

## 2021-05-18 ENCOUNTER — OFFICE VISIT (OUTPATIENT)
Dept: URGENT CARE | Facility: CLINIC | Age: 31
End: 2021-05-18

## 2021-05-18 VITALS
HEART RATE: 98 BPM | SYSTOLIC BLOOD PRESSURE: 100 MMHG | OXYGEN SATURATION: 98 % | RESPIRATION RATE: 16 BRPM | DIASTOLIC BLOOD PRESSURE: 64 MMHG | TEMPERATURE: 97.5 F

## 2021-05-18 DIAGNOSIS — J02.9 ACUTE PHARYNGITIS, UNSPECIFIED ETIOLOGY: Primary | ICD-10-CM

## 2021-05-18 LAB — S PYO AG THROAT QL: NEGATIVE

## 2021-05-18 RX ORDER — ESCITALOPRAM OXALATE 5 MG/1
TABLET ORAL
COMMUNITY
Start: 2021-04-21

## 2021-05-18 NOTE — PATIENT INSTRUCTIONS
Please obtain COVID test today  Stay home from work until results are available  Recommend symptomatic care, such as Tylenol, throat lozenges, warm salt water gargles, and antihistamines  Avoid allergens  Follow up with your PCP or return to the clinic if symptoms worsen or persist more than 3-5 days  Pharyngitis   AMBULATORY CARE:   Pharyngitis , or sore throat, is inflammation of the tissues and structures in your pharynx (throat)  Pharyngitis is most often caused by bacteria  It may also be caused by a cold or flu virus  Other causes include smoking, allergies, or acid reflux  Signs and symptoms that may occur with pharyngitis:   · Sore throat or pain when you swallow    · Fever, chills, and body aches    · Hoarse or raspy voice    · Cough, runny or stuffy nose, itchy or watery eyes    · Headache    · Upset stomach and loss of appetite    · Mild neck stiffness    · Swollen glands that feel like hard lumps when you touch your neck    · White and yellow pus-filled blisters in the back of your throat    Call 911 for any of the following:   · You have trouble breathing or swallowing because your throat is swollen or sore  Seek care immediately if:   · You are drooling because it hurts too much to swallow  · Your fever is higher than 102? F (39?C) or lasts longer than 3 days  · You are confused  · You taste blood in your throat  Contact your healthcare provider if:   · Your throat pain gets worse  · You have a painful lump in your throat that does not go away after 5 days  · Your symptoms do not improve after 5 days  · You have questions or concerns about your condition or care  Treatment for pharyngitis:  Viral pharyngitis will go away on its own without treatment  Your sore throat should start to feel better in 3 to 5 days for both viral and bacterial infections  You may need any of the following:  · Antibiotics  treat a bacterial infection      · NSAIDs , such as ibuprofen, help decrease swelling, pain, and fever  NSAIDs can cause stomach bleeding or kidney problems in certain people  If you take blood thinner medicine, always ask your healthcare provider if NSAIDs are safe for you  Always read the medicine label and follow directions  · Acetaminophen  decreases pain and fever  It is available without a doctor's order  Ask how much to take and how often to take it  Follow directions  Acetaminophen can cause liver damage if not taken correctly  Manage your symptoms:   · Gargle salt water  Mix ¼ teaspoon salt in an 8 ounce glass of warm water and gargle  This may help decrease swelling in your throat  · Drink liquids as directed  You may need to drink more liquids than usual  Liquids may help soothe your throat and prevent dehydration  Ask how much liquid to drink each day and which liquids are best for you  · Use a cool-steam humidifier  to help moisten the air in your room and calm your cough  · Soothe your throat  with cough drops, ice, soft foods, or popsicles  Prevent the spread of pharyngitis:  Cover your mouth and nose when you cough or sneeze  Do not share food or drinks  Wash your hands often  Use soap and water  If soap and water are unavailable, use an alcohol based hand   Follow up with your healthcare provider as directed:  Write down your questions so you remember to ask them during your visits  © Copyright [address] Information is for End User's use only and may not be sold, redistributed or otherwise used for commercial purposes  All illustrations and images included in CareNotes® are the copyrighted property of [employer] D [employer] M [employer]  or [address] [place] [address]  The above information is an  only  It is not intended as medical advice for individual conditions or treatments  Talk to your doctor, nurse or pharmacist before following any medical regimen to see if it is safe and effective for you

## 2021-05-18 NOTE — ASSESSMENT & PLAN NOTE
Rapid strep test negative  COVID test ordered, pt educated on quarantine requirements  Suspect symptoms are secondary to rhinitis from seasonal allergies  Recommend symptomatic care and allergen avoidance  Reasons to follow up discussed with pt

## 2021-05-18 NOTE — PROGRESS NOTES
[facility]        NAME: Bekah Leo is a 31 y o  female  : 1990    MRN: [id number]  DATE: May 18, 2021  TIME: 9:52 AM    Assessment and Plan   Acute pharyngitis, unspecified etiology [J02 9]  1  Acute pharyngitis, unspecified etiology  POCT rapid strepA    Novel Coronavirus (Covid-19),PCR [facility] - Collected at [facility] Dasil[facility] [address]         Patient Instructions       Please obtain COVID test today  Stay home from work until results are available  Recommend symptomatic care, such as Tylenol, throat lozenges, warm salt water gargles, and antihistamines  Avoid allergens  Follow up with your PCP or return to the clinic if symptoms worsen or persist more than 3-5 days  Proceed to  ER if symptoms worsen  Chief Complaint     Chief Complaint   Patient presents with    Sore Throat         History of Present Illness       Sore throat x 2 days  No fever, chills, or cough  Mild rhinitis & nasal congestion, hx environmental allergies  She tried throat lozenges and tylenol with mild improvement  She did not receive her COVID vaccine  No known sick contacts  Review of Systems   Review of Systems   Constitutional: Negative  Negative for chills and fever  HENT: Positive for postnasal drip and sore throat (pain with swallowing, worse on R side)  Negative for congestion, sinus pressure, sinus pain, trouble swallowing and voice change  Eyes: Negative  Respiratory: Negative  Negative for cough  Cardiovascular: Negative  Musculoskeletal: Negative  Skin: Negative  Allergic/Immunologic: Positive for environmental allergies  Neurological: Negative  All other systems reviewed and are negative          Current Medications       Current Outpatient Medications:     hydrOXYzine HCL (ATARAX) 25 mg tablet, Take 25 mg by mouth 3 (three) times a day as needed for anxiety, Disp: , Rfl:     escitalopram (LEXAPRO) 5 mg tablet, TAKE 0 5 TABLET(S) EVERY DAY BY ORAL ROUTE BEFORE MEALS FOR 27 DAYS , Disp: , Rfl:     Current Allergies     Allergies as of 05/18/2021    (No Known Allergies)            The following portions of the patient's history were reviewed and updated as appropriate: allergies, current medications, past family history, past medical history, past social history, past surgical history and problem list      Past Medical History:   Diagnosis Date    Anxiety        Past Surgical History:   Procedure Laterality Date    CYST REMOVAL Left        Family History   Problem Relation Age of Onset    Seizures Mother     Hyperlipidemia Father     No Known Problems Sister     Psychiatric Illness Neg Hx          Medications have been verified  Objective   /64 (BP Location: Right arm, Patient Position: Sitting, Cuff Size: Adult)   Pulse 98   Temp 97 5 °F (36 4 °C) (Tympanic)   Resp 16   SpO2 98%   No LMP recorded  Physical Exam     Physical Exam  Vitals signs reviewed  Constitutional:       Appearance: She is well-developed  HENT:      Head: Normocephalic and atraumatic  Jaw: There is normal jaw occlusion  Right Ear: Hearing, tympanic membrane, ear canal and external ear normal       Left Ear: Hearing, tympanic membrane, ear canal and external ear normal       Ears:      Comments: Cerumen in b/l ear canals     Nose: Mucosal edema and rhinorrhea present  Rhinorrhea is clear  Right Turbinates: Not enlarged or swollen  Left Turbinates: Not enlarged or swollen  Right Sinus: No maxillary sinus tenderness or frontal sinus tenderness  Left Sinus: No maxillary sinus tenderness or frontal sinus tenderness  Mouth/Throat:      Lips: Pink  Mouth: Mucous membranes are moist       Dentition: Normal dentition  Pharynx: Uvula midline  Posterior oropharyngeal erythema (cobblestoning appearance to throat) present  Tonsils: No tonsillar exudate or tonsillar abscesses  1+ on the right  1+ on the left     Eyes:      Conjunctiva/sclera: Conjunctivae normal       Pupils: Pupils are equal, round, and reactive to light  Neck:      Musculoskeletal: Normal range of motion and neck supple  Cardiovascular:      Rate and Rhythm: Normal rate and regular rhythm  Heart sounds: Normal heart sounds  Pulmonary:      Effort: Pulmonary effort is normal       Breath sounds: Normal breath sounds  Skin:     General: Skin is warm and dry  Capillary Refill: Capillary refill takes less than 2 seconds  Neurological:      General: No focal deficit present  Mental Status: She is alert and oriented to person, place, and time     Psychiatric:         Mood and Affect: Mood normal

## 2022-08-25 ENCOUNTER — APPOINTMENT (OUTPATIENT)
Dept: URGENT CARE | Facility: CLINIC | Age: 32
End: 2022-08-25

## 2022-08-25 DIAGNOSIS — E28.2 POLYCYSTIC OVARIAN SYNDROME: Primary | ICD-10-CM

## 2022-08-25 LAB
EST. AVERAGE GLUCOSE BLD GHB EST-MCNC: 100 MG/DL
ESTRADIOL SERPL-MCNC: 263 PG/ML
FSH SERPL-ACNC: 3.4 MIU/ML
HBA1C MFR BLD: 5.1 %
LH SERPL-ACNC: 33.9 MIU/ML
PROLACTIN SERPL-MCNC: 12.4 NG/ML
TSH SERPL DL<=0.05 MIU/L-ACNC: 1.36 UIU/ML (ref 0.45–4.5)

## 2022-08-25 PROCEDURE — 83001 ASSAY OF GONADOTROPIN (FSH): CPT | Performed by: FAMILY MEDICINE

## 2022-08-25 PROCEDURE — 84146 ASSAY OF PROLACTIN: CPT | Performed by: FAMILY MEDICINE

## 2022-08-25 PROCEDURE — 82670 ASSAY OF TOTAL ESTRADIOL: CPT | Performed by: FAMILY MEDICINE

## 2022-08-25 PROCEDURE — 83002 ASSAY OF GONADOTROPIN (LH): CPT | Performed by: FAMILY MEDICINE

## 2022-08-25 PROCEDURE — 83036 HEMOGLOBIN GLYCOSYLATED A1C: CPT | Performed by: FAMILY MEDICINE

## 2022-08-25 PROCEDURE — 84443 ASSAY THYROID STIM HORMONE: CPT | Performed by: FAMILY MEDICINE

## 2022-10-12 PROBLEM — J02.9 ACUTE PHARYNGITIS: Status: RESOLVED | Noted: 2021-05-18 | Resolved: 2022-10-12

## 2024-11-22 ENCOUNTER — HOSPITAL ENCOUNTER (EMERGENCY)
Facility: HOSPITAL | Age: 34
Discharge: HOME | End: 2024-11-22
Attending: EMERGENCY MEDICINE
Payer: COMMERCIAL

## 2024-11-22 VITALS
RESPIRATION RATE: 16 BRPM | SYSTOLIC BLOOD PRESSURE: 111 MMHG | HEART RATE: 99 BPM | OXYGEN SATURATION: 95 % | TEMPERATURE: 97.6 F | DIASTOLIC BLOOD PRESSURE: 71 MMHG | BODY MASS INDEX: 20.79 KG/M2 | WEIGHT: 110.01 LBS

## 2024-11-22 DIAGNOSIS — N39.0 UTI (URINARY TRACT INFECTION): Primary | ICD-10-CM

## 2024-11-22 LAB
BACTERIA UR QL AUTO: ABNORMAL /HPF
BILIRUB UR QL STRIP: ABNORMAL
CLARITY UR: ABNORMAL
COLOR UR: ABNORMAL
EXT PREGNANCY TEST URINE: NEGATIVE
EXT. CONTROL: NORMAL
GLUCOSE UR STRIP-MCNC: NEGATIVE MG/DL
HGB UR QL STRIP.AUTO: 250
KETONES UR STRIP-MCNC: NEGATIVE MG/DL
LEUKOCYTE ESTERASE UR QL STRIP: 500
MUCOUS THREADS UR QL AUTO: ABNORMAL
NITRITE UR QL STRIP: POSITIVE
NON-SQ EPI CELLS URNS QL MICRO: ABNORMAL /HPF
PH UR STRIP.AUTO: 5 [PH]
PROT UR STRIP-MCNC: ABNORMAL MG/DL
RBC #/AREA URNS AUTO: ABNORMAL /HPF
SP GR UR STRIP.AUTO: 1.01 (ref 1–1.04)
URINE COMMENT: ABNORMAL
URINE COMMENT: ABNORMAL
UROBILINOGEN UA: 8 MG/DL
WBC #/AREA URNS AUTO: ABNORMAL /HPF

## 2024-11-22 PROCEDURE — 87186 SC STD MICRODIL/AGAR DIL: CPT | Performed by: EMERGENCY MEDICINE

## 2024-11-22 PROCEDURE — 81001 URINALYSIS AUTO W/SCOPE: CPT | Performed by: EMERGENCY MEDICINE

## 2024-11-22 PROCEDURE — 81003 URINALYSIS AUTO W/O SCOPE: CPT | Performed by: EMERGENCY MEDICINE

## 2024-11-22 PROCEDURE — 99284 EMERGENCY DEPT VISIT MOD MDM: CPT

## 2024-11-22 PROCEDURE — 87591 N.GONORRHOEAE DNA AMP PROB: CPT

## 2024-11-22 PROCEDURE — 87491 CHLMYD TRACH DNA AMP PROBE: CPT

## 2024-11-22 PROCEDURE — 87086 URINE CULTURE/COLONY COUNT: CPT | Performed by: EMERGENCY MEDICINE

## 2024-11-22 PROCEDURE — 99283 EMERGENCY DEPT VISIT LOW MDM: CPT

## 2024-11-22 PROCEDURE — 87077 CULTURE AEROBIC IDENTIFY: CPT | Performed by: EMERGENCY MEDICINE

## 2024-11-22 PROCEDURE — 81025 URINE PREGNANCY TEST: CPT | Performed by: EMERGENCY MEDICINE

## 2024-11-22 RX ORDER — CEPHALEXIN 500 MG/1
500 CAPSULE ORAL ONCE
Status: COMPLETED | OUTPATIENT
Start: 2024-11-22 | End: 2024-11-22

## 2024-11-22 RX ORDER — CEPHALEXIN 500 MG/1
500 CAPSULE ORAL EVERY 12 HOURS SCHEDULED
Qty: 10 CAPSULE | Refills: 0 | Status: SHIPPED | OUTPATIENT
Start: 2024-11-22 | End: 2024-11-27

## 2024-11-22 RX ADMIN — CEPHALEXIN 500 MG: 500 CAPSULE ORAL at 22:07

## 2024-11-23 LAB
C TRACH DNA SPEC QL NAA+PROBE: NEGATIVE
N GONORRHOEA DNA SPEC QL NAA+PROBE: NEGATIVE

## 2024-11-23 NOTE — ED PROVIDER NOTES
Time reflects when diagnosis was documented in both MDM as applicable and the Disposition within this note       Time User Action Codes Description Comment    11/22/2024 10:02 PM Charline Bowman Add [N39.0] UTI (urinary tract infection)           ED Disposition       ED Disposition   Discharge    Condition   Stable    Date/Time   Fri Nov 22, 2024 10:02 PM    Comment   Bebe Brown discharge to home/self care.                   Assessment & Plan         Medical Decision Making  Patient presents with dysuria, urgency, and suprapubic pressure.  Differential diagnosis includes but is not limited to UTI, pyelonephritis, renal colic, STI, or urethritis.  UA showed 20-30 RBCs and WBCs as well as moderate bacteria.  No CVA tenderness of fevers concerning for pyelonephritis.  No flank pain suspicious for a kidney stone.  Will prescribe a course of Keflex while the urine culture is pending.  Gonorrhea and chlamydia testing is also pending.  Patient will be notified with any positive results.  She is in agreement with the treatment plan and all questions were answered.  Return precautions discussed and she verbalized understanding.  Follow up with PCP, but return to the ED in the interim with the new or worsening symptoms.    Amount and/or Complexity of Data Reviewed  Labs: ordered. Decision-making details documented in ED Course.    Risk  Prescription drug management.        ED Course as of 11/23/24 0221   Fri Nov 22, 2024 2123 Leukocytes, UA(!): 500.0   2123 Nitrite, UA(!): Positive   2123 Blood, UA(!): 250.0   2202 RBC Urine(!): 20-30   2202 WBC, UA(!): 20-30   2202 Bacteria, UA(!): Moderate       Medications   cephalexin (KEFLEX) capsule 500 mg (500 mg Oral Given 11/22/24 2207)       ED Risk Strat Scores           SBIRT 20yo+      Flowsheet Row Most Recent Value   Initial Alcohol Screen: US AUDIT-C     1. How often do you have a drink containing alcohol? 0 Filed at: 11/22/2024 2038   2. How many drinks  containing alcohol do you have on a typical day you are drinking?  0 Filed at: 11/22/2024 2038   3b. FEMALE Any Age, or MALE 65+: How often do you have 4 or more drinks on one occassion? 0 Filed at: 11/22/2024 2038   Audit-C Score 0 Filed at: 11/22/2024 2038   MARIBELL: How many times in the past year have you...    Used an illegal drug or used a prescription medication for non-medical reasons? Never Filed at: 11/22/2024 2038              History of Present Illness       Chief Complaint   Patient presents with    Possible UTI     Frequency, burning since yesterday with bloating   she states she has some blood in her urine also but thinks it could be her period   taking azo       Past Medical History:   Diagnosis Date    Anxiety       Past Surgical History:   Procedure Laterality Date    CYST REMOVAL Left       Family History   Problem Relation Age of Onset    Seizures Mother     Hyperlipidemia Father     No Known Problems Sister     Psychiatric Illness Neg Hx       Social History     Tobacco Use    Smoking status: Never    Smokeless tobacco: Never   Vaping Use    Vaping status: Never Used   Substance Use Topics    Alcohol use: Not Currently     Comment: She only drinks in social events    Drug use: Never      E-Cigarette/Vaping    E-Cigarette Use Never User       E-Cigarette/Vaping Substances    Nicotine No     THC No     CBD No     Flavoring No     Other No     Unknown No       I have reviewed and agree with the history as documented.     The patient is a 34-year-old female with no pertinent past medical history, who presents for evaluation of dysuria.  She reports developing dysuria, urgency, and suprapubic pressure yesterday.  She explains that her urine is pinkish, but is unsure if this is spotting from her menstrual cycle which just ended.  No associated vaginal discharge, back pain, nausea, vomiting, or F/C.  She has been taking Azo without significant relief.          Review of Systems   Constitutional:  Negative  for chills and fever.   HENT:  Negative for congestion, ear pain, rhinorrhea and sore throat.    Eyes:  Negative for pain and visual disturbance.   Respiratory:  Negative for cough and shortness of breath.    Cardiovascular:  Negative for chest pain and palpitations.   Gastrointestinal:  Positive for abdominal pain. Negative for constipation, nausea and vomiting.   Genitourinary:  Positive for difficulty urinating, frequency and hematuria. Negative for dysuria, flank pain, vaginal bleeding, vaginal discharge and vaginal pain.   Musculoskeletal:  Negative for arthralgias, back pain and myalgias.   Skin:  Negative for color change and rash.   Neurological:  Negative for seizures, syncope and headaches.   All other systems reviewed and are negative.      Objective       ED Triage Vitals   Temperature Pulse Blood Pressure Respirations SpO2 Patient Position - Orthostatic VS   11/22/24 2038 11/22/24 2038 11/22/24 2038 11/22/24 2038 11/22/24 2038 11/22/24 2038   97.6 °F (36.4 °C) 99 111/71 16 95 % Sitting      Temp Source Heart Rate Source BP Location FiO2 (%) Pain Score    11/22/24 2038 11/22/24 2038 11/22/24 2038 -- 11/22/24 2037    Tympanic Monitor Left arm  No Pain      Vitals      Date and Time Temp Pulse SpO2 Resp BP Pain Score FACES Pain Rating User   11/22/24 2038 97.6 °F (36.4 °C) 99 95 % 16 111/71 -- -- JM   11/22/24 2037 -- -- -- -- -- No Pain -- EZ            Physical Exam  Vitals and nursing note reviewed.   Constitutional:       General: She is awake. She is not in acute distress.     Appearance: She is well-developed, well-groomed and normal weight. She is not toxic-appearing or diaphoretic.   HENT:      Head: Normocephalic and atraumatic.      Right Ear: External ear normal.      Left Ear: External ear normal.      Nose: Nose normal.      Mouth/Throat:      Lips: Pink.      Mouth: Mucous membranes are moist.   Eyes:      General: Lids are normal. Vision grossly intact. Gaze aligned appropriately. No scleral  icterus.     Conjunctiva/sclera: Conjunctivae normal.      Pupils: Pupils are equal, round, and reactive to light.   Cardiovascular:      Rate and Rhythm: Normal rate and regular rhythm.   Pulmonary:      Effort: Pulmonary effort is normal. No respiratory distress.   Abdominal:      General: Abdomen is flat. Bowel sounds are normal.      Palpations: Abdomen is soft.      Tenderness: There is abdominal tenderness (Minimal) in the suprapubic area. There is no right CVA tenderness, left CVA tenderness, guarding or rebound.   Musculoskeletal:      Cervical back: Normal, full passive range of motion without pain and neck supple. No rigidity or crepitus. No spinous process tenderness or muscular tenderness.      Thoracic back: Normal. No tenderness or bony tenderness.      Lumbar back: Normal. No tenderness or bony tenderness.   Skin:     General: Skin is warm.      Capillary Refill: Capillary refill takes less than 2 seconds.      Coloration: Skin is not pale.      Findings: No rash.   Neurological:      Mental Status: She is alert and oriented to person, place, and time.   Psychiatric:         Attention and Perception: Attention normal.         Mood and Affect: Mood normal.         Speech: Speech normal.         Behavior: Behavior normal. Behavior is cooperative.         Results Reviewed       Procedure Component Value Units Date/Time    Urine Microscopic [[id number]]  (Abnormal) Collected: 11/22/24 2048    Lab Status: Final result Specimen: Urine, Other Updated: 11/22/24 2147     RBC, UA 20-30 /hpf      WBC, UA 20-30 /hpf      Epithelial Cells Occasional /hpf      Bacteria, UA Moderate /hpf      MUCUS THREADS Occasional     URINE COMMENT Interference-Unable to analyze    Urine culture [[id number]] Collected: 11/22/24 2048    Lab Status: In process Specimen: Urine, Other Updated: 11/22/24 2147    UA w Reflex to Microscopic w Reflex to Culture [[id number]]  (Abnormal) Collected: 11/22/24 2048    Lab Status: Final result  Specimen: Urine, Other Updated: 11/22/24 2113     Color, UA Brown     Clarity, UA Slightly Cloudy     Specific Gravity, UA 1.010     pH, UA 5.0     Leukocytes, .0     Nitrite, UA Positive     Protein, UA 30 (1+) mg/dl      Glucose, UA Negative mg/dl      Ketones, UA Negative mg/dl      Bilirubin, UA 6 mg/dL     Occult Blood, .0     URINE COMMENT Interference-Unable to analyze     UROBILINOGEN UA 8.0 mg/dL     Chlamydia/GC amplified DNA by PCR [[id number]] Collected: 11/22/24 2055    Lab Status: In process Specimen: Urine, Other Updated: 11/22/24 2059    POCT pregnancy, urine [[id number]]  (Normal) Collected: 11/22/24 2050    Lab Status: Final result Updated: 11/22/24 2051     EXT Preg Test, Ur Negative     Control Valid            No orders to display       Procedures      ED Medication and Procedure Management   Prior to Admission Medications   Prescriptions Last Dose Informant Patient Reported? Taking?   escitalopram (LEXAPRO) 5 mg tablet   Yes No   Sig: TAKE 0.5 TABLET(S) EVERY DAY BY ORAL ROUTE BEFORE MEALS FOR 30 DAYS.   hydrOXYzine HCL (ATARAX) 25 mg tablet   Yes No   Sig: Take 25 mg by mouth 3 (three) times a day as needed for anxiety      Facility-Administered Medications: None     Discharge Medication List as of 11/22/2024 10:08 PM        START taking these medications    Details   cephalexin (KEFLEX) 500 mg capsule Take 1 capsule (500 mg total) by mouth every 12 (twelve) hours for 5 days, Starting Fri 11/22/2024, Until Wed 11/27/2024, Normal           CONTINUE these medications which have NOT CHANGED    Details   escitalopram (LEXAPRO) 5 mg tablet TAKE 0.5 TABLET(S) EVERY DAY BY ORAL ROUTE BEFORE MEALS FOR 30 DAYS., Historical Med      hydrOXYzine HCL (ATARAX) 25 mg tablet Take 25 mg by mouth 3 (three) times a day as needed for anxiety, Starting Tue 11/17/2020, Historical Med           No discharge procedures on file.  ED SEPSIS DOCUMENTATION   Time reflects when diagnosis was documented in both  MDM as applicable and the Disposition within this note       Time User Action Codes Description Comment    11/22/2024 10:02 PM Charline Bowman Add [N39.0] UTI (urinary tract infection)                  Charline Bowman PA-C  11/23/24 0227

## 2024-11-24 ENCOUNTER — RESULTS FOLLOW-UP (OUTPATIENT)
Dept: EMERGENCY DEPT | Facility: HOSPITAL | Age: 34
End: 2024-11-24

## 2024-11-24 LAB — BACTERIA UR CULT: ABNORMAL

## 2024-12-02 ENCOUNTER — OFFICE VISIT (OUTPATIENT)
Dept: URGENT CARE | Age: 34
End: 2024-12-02
Payer: COMMERCIAL

## 2024-12-02 VITALS
DIASTOLIC BLOOD PRESSURE: 58 MMHG | TEMPERATURE: 97.4 F | HEART RATE: 108 BPM | RESPIRATION RATE: 18 BRPM | OXYGEN SATURATION: 97 % | SYSTOLIC BLOOD PRESSURE: 108 MMHG

## 2024-12-02 DIAGNOSIS — R30.9 PAINFUL URINATION: ICD-10-CM

## 2024-12-02 LAB
SL AMB  POCT GLUCOSE, UA: ABNORMAL
SL AMB LEUKOCYTE ESTERASE,UA: ABNORMAL
SL AMB POCT BILIRUBIN,UA: ABNORMAL
SL AMB POCT BLOOD,UA: ABNORMAL
SL AMB POCT CLARITY,UA: CLEAR
SL AMB POCT COLOR,UA: ABNORMAL
SL AMB POCT KETONES,UA: ABNORMAL
SL AMB POCT NITRITE,UA: ABNORMAL
SL AMB POCT PH,UA: 7
SL AMB POCT SPECIFIC GRAVITY,UA: 1.01
SL AMB POCT URINE PROTEIN: ABNORMAL
SL AMB POCT UROBILINOGEN: 0.2

## 2024-12-02 PROCEDURE — 87147 CULTURE TYPE IMMUNOLOGIC: CPT

## 2024-12-02 PROCEDURE — 87086 URINE CULTURE/COLONY COUNT: CPT

## 2024-12-02 PROCEDURE — 81002 URINALYSIS NONAUTO W/O SCOPE: CPT

## 2024-12-02 PROCEDURE — 99213 OFFICE O/P EST LOW 20 MIN: CPT | Performed by: EMERGENCY MEDICINE

## 2024-12-02 RX ORDER — SULFAMETHOXAZOLE AND TRIMETHOPRIM 800; 160 MG/1; MG/1
1 TABLET ORAL EVERY 12 HOURS SCHEDULED
Qty: 14 TABLET | Refills: 0 | Status: SHIPPED | OUTPATIENT
Start: 2024-12-02 | End: 2024-12-02 | Stop reason: ALTCHOICE

## 2024-12-02 RX ORDER — NITROFURANTOIN 25; 75 MG/1; MG/1
100 CAPSULE ORAL 2 TIMES DAILY
Qty: 10 CAPSULE | Refills: 0 | Status: SHIPPED | OUTPATIENT
Start: 2024-12-02 | End: 2024-12-07

## 2024-12-02 NOTE — LETTER
December 2, 2024     Patient: Bebe Brown   YOB: 1990   Date of Visit: 12/2/2024       To Whom it May Concern:    Bebe Brown was seen in my clinic on 12/2/2024.          Sincerely,          LACY Rodriguez        CC: No Recipients

## 2024-12-02 NOTE — Clinical Note
December 2, 2024     Patient: Bebe Brown   YOB: 1990   Date of Visit: 12/2/2024       To Whom It May Concern:    It is my medical opinion that Bebe Brown {Work release (duty restriction):22988}.    If you have any questions or concerns, please don't hesitate to call.         Sincerely,        LACY Rodriguez    CC: No Recipients

## 2024-12-02 NOTE — PROGRESS NOTES
[facility]        NAME: Bbee Brown is a 34 y.o. female  : 1990    MRN: [id number]  DATE: 2024  TIME: 2:16 PM      Assessment and Plan     Painful urination [R30.9]  1. Painful urination  POCT urine dip      Patient was treated with Keflex for UTI on 1122.  Urine culture shows susceptibility to Keflex and macrobid.  Will switch patient to macrobid. Urine culture sent will hold on bactrim at this time as patient takes prozac.       Patient Instructions     Take antibiotic as prescribed. Recommend probiotic use while taking antibiotic.   Urine culture sent; results will appear in mychart.   Hydrate.   Acetaminophen as needed.   Follow-up with urology.   Follow-up with PCP in 1-2 days. Go to ER if symptoms worsen.     Chief Complaint     Chief Complaint   Patient presents with    Possible UTI     Patient was treated at the ER last week with cephalexin for a UTI, she is still having painful urination, pain in her lower abdomen and urgency and frequency when urinating.         History of Present Illness     Patient is a 34-year-old female who presents with dysuria and frequency.  Patient was recently treated with urinary tract infection on 112.  States her symptoms were improving and then returned.  Denies fever or chills.  Denies abdominal pain back pain or flank pain.  Denies chance of pregnancy.        Review of Systems     Review of Systems   Constitutional:  Negative for chills and fever.   Gastrointestinal:  Negative for abdominal pain, diarrhea, nausea and vomiting.   Genitourinary:  Positive for dysuria and frequency.   All other systems reviewed and are negative.        Current Medications       Current Outpatient Medications:     escitalopram (LEXAPRO) 5 mg tablet, TAKE 0.5 TABLET(S) EVERY DAY BY ORAL ROUTE BEFORE MEALS FOR 30 DAYS., Disp: , Rfl:     hydrOXYzine HCL (ATARAX) 25 mg tablet, Take 25 mg by mouth 3 (three) times a day as needed for anxiety, Disp: , Rfl:      Current Allergies     Allergies as of 12/02/2024    (No Known Allergies)              The following portions of the patient's history were reviewed and updated as appropriate: allergies, current medications, past family history, past medical history, past social history, past surgical history and problem list.     Past Medical History:   Diagnosis Date    Anxiety        Past Surgical History:   Procedure Laterality Date    CYST REMOVAL Left        Family History   Problem Relation Age of Onset    Seizures Mother     Hyperlipidemia Father     No Known Problems Sister     Psychiatric Illness Neg Hx          Medications have been verified.        Objective     /58   Pulse (!) 108   Temp (!) 97.4 °F (36.3 °C)   Resp 18   SpO2 97%   No LMP recorded.         Physical Exam     Physical Exam  Vitals and nursing note reviewed.   Constitutional:       General: She is awake. She is not in acute distress.     Appearance: Normal appearance. She is not ill-appearing, toxic-appearing or diaphoretic.   Cardiovascular:      Rate and Rhythm: Normal rate.      Pulses: Normal pulses.      Heart sounds: Normal heart sounds, S1 normal and S2 normal.   Pulmonary:      Effort: Pulmonary effort is normal.      Breath sounds: Normal breath sounds and air entry.   Abdominal:      General: Abdomen is flat. Bowel sounds are normal.      Palpations: Abdomen is soft.      Tenderness: There is no right CVA tenderness, left CVA tenderness, guarding or rebound.   Skin:     General: Skin is warm.      Capillary Refill: Capillary refill takes less than 2 seconds.   Neurological:      Mental Status: She is alert.   Psychiatric:         Mood and Affect: Mood normal.         Behavior: Behavior normal.         Thought Content: Thought content normal.         Judgment: Judgment normal.

## 2024-12-02 NOTE — PATIENT INSTRUCTIONS
Take antibiotic as prescribed. Recommend probiotic use while taking antibiotic.   Urine culture sent; results will appear in mychart.   Hydrate.   Acetaminophen as needed.   Follow-up with urology.   Follow-up with PCP in 1-2 days. Go to ER if symptoms worsen.

## 2024-12-02 NOTE — PROGRESS NOTES
[facility]        NAME: Bebe Brown is a 34 y.o. female  : 1990    MRN: [id number]  DATE: 2024  TIME: 1:54 PM    Assessment and Plan   No primary diagnosis found.  No diagnosis found.      Patient Instructions     Take antibiotic as prescribed  Drink plenty of water   Cranberry supplements  Urinate within 5 minutes following intercourse  Follow up with OB/GYN  Follow up with PCP in 3-5 days.  Proceed to ER if symptoms worsen.     Eat yogurt with live and active cultures and/or take a probiotic at least 3 hours before or after antibiotic dose. Monitor stool for diarrhea and/or blood. If this occurs, contact primary care doctor ASAP.    If tests are performed, our office will contact you with results only if changes need to made to the care plan discussed with you at the visit. [facility]    Chief Complaint   No chief complaint on file.        History of Present Illness       Patient is a 33 yo female with no significant PMH presenting in the clinic today for UTI sx x days. Admits  Patient was seen in the ED on 2024, diagnosed with a UTI, and treated with cephalexin.  Denies  Admits the use of __ for symptom management.          Review of Systems   Review of Systems   Constitutional:  Negative for chills and fever.   Respiratory:  Negative for shortness of breath.    Cardiovascular:  Negative for chest pain.   Gastrointestinal:  Negative for abdominal pain, diarrhea, nausea and vomiting.   Genitourinary:  Negative for dysuria, flank pain, frequency, hematuria and urgency.         Current Medications       Current Outpatient Medications:     escitalopram (LEXAPRO) 5 mg tablet, TAKE 0.5 TABLET(S) EVERY DAY BY ORAL ROUTE BEFORE MEALS FOR 30 DAYS., Disp: , Rfl:     hydrOXYzine HCL (ATARAX) 25 mg tablet, Take 25 mg by mouth 3 (three) times a day as needed for anxiety, Disp: , Rfl:     Current Allergies     Allergies as of  12/02/2024    (No Known Allergies)            The following portions of the patient's history were reviewed and updated as appropriate: allergies, current medications, past family history, past medical history, past social history, past surgical history and problem list.     Past Medical History:   Diagnosis Date    Anxiety        Past Surgical History:   Procedure Laterality Date    CYST REMOVAL Left        Family History   Problem Relation Age of Onset    Seizures Mother     Hyperlipidemia Father     No Known Problems Sister     Psychiatric Illness Neg Hx          Medications have been verified.        Objective   There were no vitals taken for this visit.       Physical Exam     Physical Exam  Vitals reviewed.   Constitutional:       General: She is not in acute distress.     Appearance: Normal appearance. She is normal weight. She is not ill-appearing.   HENT:      Head: Normocephalic.      Nose: Nose normal.      Mouth/Throat:      Mouth: Mucous membranes are moist.   Eyes:      Conjunctiva/sclera: Conjunctivae normal.   Cardiovascular:      Rate and Rhythm: Normal rate and regular rhythm.      Pulses: Normal pulses.      Heart sounds: Normal heart sounds. No murmur heard.     No friction rub. No gallop.   Pulmonary:      Effort: Pulmonary effort is normal.      Breath sounds: Normal breath sounds. No wheezing, rhonchi or rales.   Abdominal:      General: Abdomen is flat. Bowel sounds are normal. There is no distension.      Palpations: Abdomen is soft.      Tenderness: There is no abdominal tenderness. There is no right CVA tenderness, left CVA tenderness or guarding.   Skin:     General: Skin is warm.      Capillary Refill: Capillary refill takes less than 2 seconds.   Neurological:      Mental Status: She is alert.   Psychiatric:         Mood and Affect: Mood normal.         Behavior: Behavior normal.

## 2024-12-04 LAB
BACTERIA UR CULT: ABNORMAL
BACTERIA UR CULT: ABNORMAL

## 2024-12-05 ENCOUNTER — RESULTS FOLLOW-UP (OUTPATIENT)
Dept: URGENT CARE | Age: 34
End: 2024-12-05

## 2024-12-13 ENCOUNTER — TELEPHONE (OUTPATIENT)
Dept: URGENT CARE | Age: 34
End: 2024-12-13

## 2024-12-14 NOTE — TELEPHONE ENCOUNTER
Patient called the clinic today to discuss urine culture results.  Patient identity verified via name, date of birth, and address.  Patient states she is on the phone with her boyfriend.  Patient notes that she gives consent for boyfriend to be present for this discussion regarding her medical information.  Patient initially questioned urine culture results.  Educated patient and patient's boyfriend regarding typical causes of UTIs, testing, and, and treatments.  Patient denies current UTI symptoms such as dysuria and hematuria.  Patient also requested to discuss gonorrhea chlamydia results completed in the ER in November 2024.  I discussed with patient that these results were negative.    Patient then requested I discuss and educate patient and patient's boyfriend regarding hormonal testing done to test for PCOS.  She states this testing was done in 2022 and ordered by her PCP.  Patient notes she had ultrasound completed in 2022 which showed PCOS.  I educated patient on the definition of of PCOS, common testing, and common treatments.  Patient states she is not currently followed by OB/GYN.    Patient then requested I discuss and educate patient and patient's boyfriend regarding Streptococcus pharyngitis infections.  I was able to educate patient on the definition of Streptococcus pharyngitis, common testing, and, and treatment plans.  I also discussed typical incubation period/spread of infection.    All patient and patient's boyfriend's questions answered at this time.

## 2025-04-17 ENCOUNTER — HOSPITAL ENCOUNTER (EMERGENCY)
Facility: HOSPITAL | Age: 35
Discharge: HOME | End: 2025-04-17
Attending: STUDENT IN AN ORGANIZED HEALTH CARE EDUCATION/TRAINING PROGRAM
Payer: COMMERCIAL

## 2025-04-17 VITALS
WEIGHT: 109.13 LBS | RESPIRATION RATE: 18 BRPM | TEMPERATURE: 98.1 F | DIASTOLIC BLOOD PRESSURE: 60 MMHG | BODY MASS INDEX: 20.62 KG/M2 | SYSTOLIC BLOOD PRESSURE: 117 MMHG | HEART RATE: 87 BPM | OXYGEN SATURATION: 99 %
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
## 2025-04-17 LAB
EXT PREGNANCY TEST URINE: NEGATIVE
EXT. CONTROL: NORMAL

## 2025-04-17 PROCEDURE — 90715 TDAP VACCINE 7 YRS/> IM: CPT | Performed by: STUDENT IN AN ORGANIZED HEALTH CARE EDUCATION/TRAINING PROGRAM

## 2025-04-17 PROCEDURE — 81025 URINE PREGNANCY TEST: CPT | Performed by: STUDENT IN AN ORGANIZED HEALTH CARE EDUCATION/TRAINING PROGRAM

## 2025-04-17 PROCEDURE — 12001 RPR S/N/AX/GEN/TRNK 2.5CM/<: CPT | Performed by: STUDENT IN AN ORGANIZED HEALTH CARE EDUCATION/TRAINING PROGRAM

## 2025-04-17 PROCEDURE — 99283 EMERGENCY DEPT VISIT LOW MDM: CPT

## 2025-04-17 PROCEDURE — 99284 EMERGENCY DEPT VISIT MOD MDM: CPT | Performed by: STUDENT IN AN ORGANIZED HEALTH CARE EDUCATION/TRAINING PROGRAM

## 2025-04-17 PROCEDURE — 90471 IMMUNIZATION ADMIN: CPT

## 2025-04-17 RX ORDER — IBUPROFEN 600 MG/1
600 TABLET, FILM COATED ORAL ONCE
Status: COMPLETED | OUTPATIENT
Start: 2025-04-17 | End: 2025-04-17

## 2025-04-17 RX ORDER — LIDOCAINE HYDROCHLORIDE 10 MG/ML
5 INJECTION, SOLUTION EPIDURAL; INFILTRATION; INTRACAUDAL; PERINEURAL ONCE
Status: COMPLETED | OUTPATIENT
Start: 2025-04-17 | End: 2025-04-17

## 2025-04-17 RX ORDER — GINSENG 100 MG
1 CAPSULE ORAL ONCE
Status: COMPLETED | OUTPATIENT
Start: 2025-04-17 | End: 2025-04-17

## 2025-04-17 RX ADMIN — IBUPROFEN 600 MG: 600 TABLET, FILM COATED ORAL at 16:49

## 2025-04-17 RX ADMIN — LIDOCAINE HYDROCHLORIDE 5 ML: 10 INJECTION, SOLUTION EPIDURAL; INFILTRATION; INTRACAUDAL at 16:49

## 2025-04-17 RX ADMIN — BACITRACIN 1 SMALL APPLICATION: 500 OINTMENT TOPICAL at 18:29

## 2025-04-17 RX ADMIN — TETANUS TOXOID, REDUCED DIPHTHERIA TOXOID AND ACELLULAR PERTUSSIS VACCINE, ADSORBED 0.5 ML: 5; 2.5; 8; 8; 2.5 SUSPENSION INTRAMUSCULAR at 16:49

## 2025-04-17 RX ADMIN — AMOXICILLIN AND CLAVULANATE POTASSIUM 1 TABLET: 875; 125 TABLET, FILM COATED ORAL at 16:49

## 2025-04-17 NOTE — ED PROVIDER NOTES
Time reflects when diagnosis was documented in both MDM as applicable and the Disposition within this note       Time User Action Codes Description Comment    4/17/2025  6:19 PM ChadLorrie Add [W54.0XXA] Dog bite, initial encounter           ED Disposition       ED Disposition   Discharge    Condition   Stable    Date/Time   Thu Apr 17, 2025  6:19 PM    Comment   Bebe Brown discharge to home/self care.                   Assessment & Plan       Medical Decision Making  34-year-old female, presenting to the emergency department for evaluation of right forearm dog bite, laceration. Tdap updated. Given first dose of Augmentin here. Patient irrigated wound thoroughly under sink.  Given wound was gaping and had adipose exposed placed on suture in middle to help bring the middle of the wound together and left ends open to heal by secondary intent.  Wound is more well aligned and not gaping and still has areas left open to limit infection risk.  Recommend suture removal in about 7 days.  Discharged with 10-day course of Augmentin, PCP follow-up, strict ED return precautions discussed, including any signs of infection including fevers, redness, drainage increasing pain or other concerns.  Bacitracin and dressing applied over laceration prior to discharge.  All questions patient had were answered.    Amount and/or Complexity of Data Reviewed  Labs: ordered.    Risk  OTC drugs.  Prescription drug management.             Medications   tetanus-diphtheria-acellular pertussis (BOOSTRIX) IM injection 0.5 mL (0.5 mL Intramuscular Given 4/17/25 1649)   amoxicillin-clavulanate (AUGMENTIN) 875-125 mg per tablet 1 tablet (1 tablet Oral Given 4/17/25 1649)   ibuprofen (MOTRIN) tablet 600 mg (600 mg Oral Given 4/17/25 1649)   lidocaine (PF) (XYLOCAINE-MPF) 1 % injection 5 mL (5 mL Infiltration Given 4/17/25 1649)   bacitracin topical ointment 1 small application (1 small application Topical Given 4/17/25 1829)       ED  Risk Strat Scores                    No data recorded        SBIRT 22yo+      Flowsheet Row Most Recent Value   Initial Alcohol Screen: US AUDIT-C     1. How often do you have a drink containing alcohol? 0 Filed at: 04/17/2025 1621   2. How many drinks containing alcohol do you have on a typical day you are drinking?  0 Filed at: 04/17/2025 1624   3b. FEMALE Any Age, or MALE 65+: How often do you have 4 or more drinks on one occassion? 0 Filed at: 04/17/2025 1624   Audit-C Score 0 Filed at: 04/17/2025 1629   MARIBELL: How many times in the past year have you...    Used an illegal drug or used a prescription medication for non-medical reasons? Never Filed at: 04/17/2025 1623                            History of Present Illness       Chief Complaint   Patient presents with    Dog Bite     Reports being bit by dog in R arm - patient is owner of dog and states they are UTD on vax       Past Medical History:   Diagnosis Date    Anxiety       Past Surgical History:   Procedure Laterality Date    CYST REMOVAL Left       Family History   Problem Relation Age of Onset    Seizures Mother     Hyperlipidemia Father     No Known Problems Sister     Psychiatric Illness Neg Hx       Social History     Tobacco Use    Smoking status: Never    Smokeless tobacco: Never   Vaping Use    Vaping status: Never Used   Substance Use Topics    Alcohol use: Not Currently     Comment: She only drinks in social events    Drug use: Never      E-Cigarette/Vaping    E-Cigarette Use Never User       E-Cigarette/Vaping Substances    Nicotine No     THC No     CBD No     Flavoring No     Other No     Unknown No       I have reviewed and agree with the history as documented.     HPI    34-year-old female, presenting to the emergency department for evaluation of right forearm dog bite.  Patient states that her dog accidentally bit her forearm prior to arrival  While they were playing.  Her dog's vaccines are up-to-date.  She is unsure when her last tetanus  shot was.  No other injuries.  No other complaints.    Review of Systems        Objective       ED Triage Vitals   Temperature Pulse Blood Pressure Respirations SpO2 Patient Position - Orthostatic VS   04/17/25 1624 04/17/25 1624 04/17/25 1624 04/17/25 1624 04/17/25 1624 04/17/25 1624   98.1 °F (36.7 °C) 87 117/60 18 99 % Sitting      Temp Source Heart Rate Source BP Location FiO2 (%) Pain Score    04/17/25 1624 04/17/25 1624 04/17/25 1624 -- 04/17/25 1649    Oral Monitor Left arm  4      Vitals      Date and Time Temp Pulse SpO2 Resp BP Pain Score FACES Pain Rating User   04/17/25 1649 -- -- -- -- -- 4 -- VIPUL   04/17/25 1624 98.1 °F (36.7 °C) 87 99 % 18 117/60 -- -- VIPUL            Physical Exam  Vitals and nursing note reviewed.   Constitutional:       General: She is not in acute distress.     Appearance: Normal appearance. She is not ill-appearing, toxic-appearing or diaphoretic.   HENT:      Head: Normocephalic and atraumatic.      Mouth/Throat:      Pharynx: Oropharynx is clear.   Musculoskeletal:         General: No swelling or deformity. Normal range of motion.   Skin:     General: Skin is warm and dry.      Findings: Laceration (approx 1 x 1.5 cm laceration to the R mid forearm with exposed adipose, no hemorrhage, no visible foreign body) present.   Neurological:      Mental Status: She is alert.         Results Reviewed       Procedure Component Value Units Date/Time    POCT pregnancy, urine [[id number]]  (Normal) Collected: 04/17/25 1642    Lab Status: Final result Updated: 04/17/25 1651     EXT Preg Test, Ur Negative     Control Valid            No orders to display       Universal Protocol:  procedure performed by consultantConsent: Verbal consent obtained.  Consent given by: patient  Laceration repair    Date/Time: 4/17/2025 5:40 PM    Performed by: Lorrie Bonilla DO  Authorized by: Lorrie Bonilla DO  Body area: upper extremity  Location details: right lower arm  Laceration length: 1 cm  Foreign  bodies: no foreign bodies  Tendon involvement: none  Nerve involvement: none  Vascular damage: no  Anesthesia: local infiltration    Anesthesia:  Local Anesthetic: lidocaine 1% without epinephrine    Sedation:  Patient sedated: no      Wound Dehiscence:  Superficial Wound Dehiscence: simple closure      Procedure Details:  Preparation: Patient was prepped and draped in the usual sterile fashion.  Irrigation solution: tap water  Amount of cleaning: extensive  Debridement: none  Degree of undermining: none  Skin closure: 4-0 nylon  Number of sutures: 1  Technique: simple  Approximation: loose  Approximation difficulty: simple  Dressing: antibiotic ointment  Patient tolerance: patient tolerated the procedure well with no immediate complications  Comments: Laceration only repaired with one suture in middle with opened ends which were brought more together (previously was gaping and had adipose exposed). Left ends open to prevent infection as laceration caused by dog bite.           ED Medication and Procedure Management   Prior to Admission Medications   Prescriptions Last Dose Informant Patient Reported? Taking?   escitalopram (LEXAPRO) 5 mg tablet   Yes No   Sig: TAKE 0.5 TABLET(S) EVERY DAY BY ORAL ROUTE BEFORE MEALS FOR 30 DAYS.   hydrOXYzine HCL (ATARAX) 25 mg tablet   Yes No   Sig: Take 25 mg by mouth 3 (three) times a day as needed for anxiety      Facility-Administered Medications: None     Discharge Medication List as of 4/17/2025  6:23 PM        START taking these medications    Details   amoxicillin-clavulanate (AUGMENTIN) 875-125 mg per tablet Take 1 tablet by mouth every 12 (twelve) hours for 10 days, Starting Thu 4/17/2025, Until Sun 4/27/2025, Normal           CONTINUE these medications which have NOT CHANGED    Details   escitalopram (LEXAPRO) 5 mg tablet TAKE 0.5 TABLET(S) EVERY DAY BY ORAL ROUTE BEFORE MEALS FOR 30 DAYS., Historical Med      hydrOXYzine HCL (ATARAX) 25 mg tablet Take 25 mg by mouth 3  (three) times a day as needed for anxiety, Starting Tue 11/17/2020, Historical Med           No discharge procedures on file.  ED SEPSIS DOCUMENTATION   Time reflects when diagnosis was documented in both MDM as applicable and the Disposition within this note       Time User Action Codes Description Comment    4/17/2025  6:19 PM Lorrie Bonilla Add [W54.0XXA] Dog bite, initial encounter                  Lorrie Bonilla,   04/17/25 3157

## 2025-04-17 NOTE — DISCHARGE INSTRUCTIONS
You were seen in the ER today for evaluation of a dog bite injury/wound to your right forearm.  Your tetanus was updated and you were started on prophylactic antibiotic to prevent infection.  The area was numbed and you washed the area thoroughly under water.  Because the wound was gaping 1 suture was placed to help close the middle of the wound to assist with healing with the 2 ends being left open.  The sutures should be be left in place for 7 days and then can be removed at any urgent care, emergency department or with your primary care doctor.  Please keep the area clean and dry.  You can keep the area covered as well with bandage.  Please return to the emergency department for any signs of infection like worsening pain, redness, drainage, fevers.  Please take all antibiotic prescribed as directed.  Please return to emergency department for any other new or worsening concerns.

## 2025-05-24 ENCOUNTER — HOSPITAL ENCOUNTER (EMERGENCY)
Facility: HOSPITAL | Age: 35
Discharge: HOME | End: 2025-05-24
Attending: EMERGENCY MEDICINE
Payer: COMMERCIAL

## 2025-05-24 VITALS
RESPIRATION RATE: 20 BRPM | SYSTOLIC BLOOD PRESSURE: 127 MMHG | HEART RATE: 95 BPM | OXYGEN SATURATION: 99 % | DIASTOLIC BLOOD PRESSURE: 73 MMHG | WEIGHT: 106.92 LBS | BODY MASS INDEX: 20.2 KG/M2 | TEMPERATURE: 97.8 F

## 2025-05-24 DIAGNOSIS — S09.90XA CLOSED HEAD INJURY, INITIAL ENCOUNTER: Primary | ICD-10-CM

## 2025-05-24 PROCEDURE — 99284 EMERGENCY DEPT VISIT MOD MDM: CPT | Performed by: EMERGENCY MEDICINE

## 2025-05-24 PROCEDURE — 99284 EMERGENCY DEPT VISIT MOD MDM: CPT

## 2025-05-24 NOTE — ED PROVIDER NOTES
Time reflects when diagnosis was documented in both MDM as applicable and the Disposition within this note       Time User Action Codes Description Comment    5/24/2025  3:54 PM BrittanyRenaldog Add [S09.90XA] Closed head injury, initial encounter           ED Disposition       ED Disposition   Discharge    Condition   Stable    Date/Time   Sat May 24, 2025  3:54 PM    Comment   Bebe Brown discharge to home/self care.                   Assessment & Plan       Medical Decision Making  Problems Addressed:  Closed head injury, initial encounter: acute illness or injury     Details: Well appearing after event.  No persistent symptoms.  Neuro intact on exam.  Discussed options of ct vs obs.  Patient opted for obs.  RTED for any concerns.         ED Course as of 05/25/25 1626   Sat May 24, 2025   1555 Had discussion with patient re: ct vs. Obs.  Patient opted for no imaging at this time.  Given [facility] instructions.         Medications - No data to display    ED Risk Strat Scores                    No data recorded                            History of Present Illness       Chief Complaint   Patient presents with    Fall     Pt reports fall backwards and hit back of head on floor. Unsure LOC, reports dizziness and vomiting.        Past Medical History[1]   Past Surgical History[2]   Family History[3]   Social History[4]   E-Cigarette/Vaping    E-Cigarette Use Never User       E-Cigarette/Vaping Substances    Nicotine No     THC No     CBD No     Flavoring No     Other No     Unknown No       I have reviewed and agree with the history as documented.     Patient is a 34-year-old female who presents after a fall and HI.  Patient was trying to fix her doorknob in house.  Fell backwards striking head.  +amnesia to events.  Did have some dizziness and nausea after events.  Able to take a bath and ambulate to ER without any issues afterwards.  No numbness/tingling.  Did take some motrin earlier.          Review of Systems    Constitutional:  Negative for chills and fever.   HENT:  Negative for ear pain and sore throat.    Eyes:  Negative for pain and visual disturbance.   Respiratory:  Negative for cough and shortness of breath.    Cardiovascular:  Negative for chest pain and palpitations.   Gastrointestinal:  Negative for abdominal pain and vomiting.   Genitourinary:  Negative for dysuria and hematuria.   Musculoskeletal:  Negative for arthralgias and back pain.   Skin:  Negative for color change and rash.   Neurological:  Positive for syncope. Negative for seizures.   All other systems reviewed and are negative.          Objective       ED Triage Vitals [05/24/25 1549]   Temperature Pulse Blood Pressure Respirations SpO2 Patient Position - Orthostatic VS   97.8 °F (36.6 °C) 95 127/73 20 99 % Sitting      Temp Source Heart Rate Source BP Location FiO2 (%) Pain Score    Oral Monitor Left arm -- --      Vitals      Date and Time Temp Pulse SpO2 Resp BP Pain Score FACES Pain Rating User   05/24/25 1549 97.8 °F (36.6 °C) 95 99 % 20 127/73 -- -- ES            Physical Exam  Vitals and nursing note reviewed.   Constitutional:       Appearance: Normal appearance. She is normal weight.   HENT:      Head: Normocephalic and atraumatic.      Comments: Patient without any swelling, tenderness to palpation, no septal hematoma, no hemotympanum, no orbital tenderness to palpation, no TMJ tenderness, no malocclusion.       Right Ear: Tympanic membrane, ear canal and external ear normal.      Left Ear: Tympanic membrane, ear canal and external ear normal.      Nose: Nose normal.      Mouth/Throat:      Mouth: Mucous membranes are moist.      Pharynx: Oropharynx is clear.     Eyes:      Conjunctiva/sclera: Conjunctivae normal.      Pupils: Pupils are equal, round, and reactive to light.       Cardiovascular:      Rate and Rhythm: Normal rate and regular rhythm.      Pulses: Normal pulses.      Heart sounds: Normal heart sounds.   Pulmonary:       Effort: Pulmonary effort is normal.      Breath sounds: Normal breath sounds.   Abdominal:      General: Abdomen is flat.      Palpations: Abdomen is soft.     Musculoskeletal:         General: Normal range of motion.      Cervical back: Normal range of motion and neck supple.     Skin:     General: Skin is warm and dry.      Capillary Refill: Capillary refill takes less than 2 seconds.     Neurological:      General: No focal deficit present.      Mental Status: She is alert and oriented to person, place, and time.      Cranial Nerves: No cranial nerve deficit or dysarthria.      Sensory: Sensation is intact. No sensory deficit.      Motor: No weakness, tremor, atrophy, abnormal muscle tone or pronator drift.      Coordination: Coordination is intact. Finger-Nose-Finger Test normal.      Gait: Gait normal.     Psychiatric:         Mood and Affect: Mood normal.         Behavior: Behavior normal.         Results Reviewed       None            No orders to display       Procedures    ED Medication and Procedure Management   Prior to Admission Medications   Prescriptions Last Dose Informant Patient Reported? Taking?   escitalopram (LEXAPRO) 5 mg tablet   Yes No   Sig: TAKE 0.5 TABLET(S) EVERY DAY BY ORAL ROUTE BEFORE MEALS FOR 30 DAYS.   hydrOXYzine HCL (ATARAX) 25 mg tablet   Yes No   Sig: Take 25 mg by mouth 3 (three) times a day as needed for anxiety      Facility-Administered Medications: None     Discharge Medication List as of 5/24/2025  3:54 PM        CONTINUE these medications which have NOT CHANGED    Details   escitalopram (LEXAPRO) 5 mg tablet TAKE 0.5 TABLET(S) EVERY DAY BY ORAL ROUTE BEFORE MEALS FOR 30 DAYS., Historical Med      hydrOXYzine HCL (ATARAX) 25 mg tablet Take 25 mg by mouth 3 (three) times a day as needed for anxiety, Starting Tue 11/17/2020, Historical Med           No discharge procedures on file.  ED SEPSIS DOCUMENTATION   Time reflects when diagnosis was documented in both MDM as  applicable and the Disposition within this note       Time User Action Codes Description Comment    5/24/2025  3:54 PM Yuri Soto Add [S09.90XA] Closed head injury, initial encounter                    [1]   Past Medical History:  Diagnosis Date    Anxiety    [2]   Past Surgical History:  Procedure Laterality Date    CYST REMOVAL Left    [3]   Family History  Problem Relation Name Age of Onset    Seizures Mother      Hyperlipidemia Father      No Known Problems Sister      Psychiatric Illness Neg Hx     [4]   Social History  Tobacco Use    Smoking status: Never    Smokeless tobacco: Never   Vaping Use    Vaping status: Never Used   Substance Use Topics    Alcohol use: Not Currently     Comment: She only drinks in social events    Drug use: Never        Yuri Soto MD  05/25/25 7770

## 2025-05-24 NOTE — ED NOTES
Patient assessed and discharged by provider. RN had no interaction with this patient.     Shaista Bautista, ELAINE  05/24/25 0806